# Patient Record
Sex: FEMALE | Race: WHITE | Employment: OTHER | URBAN - METROPOLITAN AREA
[De-identification: names, ages, dates, MRNs, and addresses within clinical notes are randomized per-mention and may not be internally consistent; named-entity substitution may affect disease eponyms.]

---

## 2017-05-09 ENCOUNTER — HOSPITAL ENCOUNTER (OUTPATIENT)
Dept: MAMMOGRAPHY | Age: 42
Discharge: HOME | End: 2017-05-09
Attending: OBSTETRICS & GYNECOLOGY
Payer: COMMERCIAL

## 2017-05-09 DIAGNOSIS — N63.10 LUMP OF RIGHT BREAST: ICD-10-CM

## 2017-05-09 PROCEDURE — 76642 ULTRASOUND BREAST LIMITED: CPT

## 2017-05-09 PROCEDURE — 77066 DX MAMMO INCL CAD BI: CPT

## 2017-05-21 NOTE — PROGRESS NOTES
Notify patient MMG and breast US  are nl, keep an eye on her SBE, let us know if anything changes or she has concerns. Radiologist advises a f/u mammogram (right breast) in 6 months is recommended to document expected stability or resolution of these findings. Thanks.

## 2017-05-29 NOTE — PROGRESS NOTES
Notify patient MMG and additional breast US  are nl, keep an eye on her SBE, let us know if anything changes or she has concerns. F/u  Right breast  MMG is advised   in 6 m per radiologist.   Thanks.

## 2017-09-19 ENCOUNTER — HOSPITAL ENCOUNTER (OUTPATIENT)
Dept: MAMMOGRAPHY | Age: 42
Discharge: HOME | End: 2017-09-19
Attending: OBSTETRICS & GYNECOLOGY
Payer: COMMERCIAL

## 2017-09-19 DIAGNOSIS — N64.4 PAIN OF RIGHT BREAST: ICD-10-CM

## 2017-09-19 DIAGNOSIS — R92.8 FOLLOW-UP EXAMINATION OF ABNORMAL MAMMOGRAM: ICD-10-CM

## 2017-09-19 DIAGNOSIS — N64.4 BREAST PAIN, RIGHT: ICD-10-CM

## 2017-09-19 PROCEDURE — 77065 DX MAMMO INCL CAD UNI: CPT

## 2017-11-01 NOTE — PROGRESS NOTES
Notify pt (my chart, letter or call and document in [facility]):  Right breast MMG is normal.  Advise her to see her plastic surgeon who did her breast implant re the breast pain & document. Continue monthly SBE, notify us if she has any concerns. Thanks.

## 2018-06-28 PROBLEM — J45.990 EXERCISE INDUCED BRONCHOSPASM: Status: ACTIVE | Noted: 2018-06-28

## 2018-07-19 ENCOUNTER — HOSPITAL ENCOUNTER (OUTPATIENT)
Dept: MAMMOGRAPHY | Age: 43
Discharge: HOME | End: 2018-07-19
Attending: OBSTETRICS & GYNECOLOGY
Payer: COMMERCIAL

## 2018-07-19 DIAGNOSIS — Z12.31 VISIT FOR SCREENING MAMMOGRAM: ICD-10-CM

## 2018-07-19 PROCEDURE — 77063 BREAST TOMOSYNTHESIS BI: CPT

## 2018-07-23 NOTE — PROGRESS NOTES
Notify pt (my chart, letter or call and document in [facility]) :  MMG is normal.  Continue monthly SBE, notify us if she has any concerns. There is no FH of breast cancer recorded on her chart. Thanks.

## 2018-10-08 ENCOUNTER — HOSPITAL ENCOUNTER (OUTPATIENT)
Dept: MAMMOGRAPHY | Age: 43
Discharge: HOME | End: 2018-10-08
Attending: OBSTETRICS & GYNECOLOGY
Payer: SELF-PAY

## 2018-10-08 DIAGNOSIS — E28.39 PREMATURE OVARIAN FAILURE: ICD-10-CM

## 2018-10-08 PROCEDURE — 77080 DXA BONE DENSITY AXIAL: CPT

## 2018-10-11 NOTE — PROGRESS NOTES
Results sent to pt by [facility] Real-Time message. Order for a vitamin D level put into [facility].

## 2019-08-09 ENCOUNTER — HOSPITAL ENCOUNTER (OUTPATIENT)
Dept: ULTRASOUND IMAGING | Age: 44
Discharge: HOME | End: 2019-08-09
Attending: OBSTETRICS & GYNECOLOGY
Payer: COMMERCIAL

## 2019-08-09 DIAGNOSIS — E01.0 THYROMEGALY: ICD-10-CM

## 2019-08-09 PROCEDURE — 76536 US EXAM OF HEAD AND NECK: CPT

## 2019-08-21 PROBLEM — R06.02 SHORTNESS OF BREATH: Status: ACTIVE | Noted: 2019-08-21

## 2019-09-11 ENCOUNTER — HOSPITAL ENCOUNTER (OUTPATIENT)
Dept: MAMMOGRAPHY | Age: 44
Discharge: HOME | End: 2019-09-11
Attending: OBSTETRICS & GYNECOLOGY
Payer: COMMERCIAL

## 2019-09-11 DIAGNOSIS — R92.2 BREAST DENSITY: ICD-10-CM

## 2019-09-11 DIAGNOSIS — Z12.39 SCREENING FOR BREAST CANCER: ICD-10-CM

## 2019-09-11 PROCEDURE — 77063 BREAST TOMOSYNTHESIS BI: CPT

## 2020-08-07 ENCOUNTER — HOSPITAL ENCOUNTER (OUTPATIENT)
Dept: MAMMOGRAPHY | Age: 45
Discharge: HOME | End: 2020-08-07
Attending: OBSTETRICS & GYNECOLOGY
Payer: COMMERCIAL

## 2020-08-07 DIAGNOSIS — Z12.31 OTHER SCREENING MAMMOGRAM: ICD-10-CM

## 2020-08-07 PROCEDURE — 77063 BREAST TOMOSYNTHESIS BI: CPT

## 2020-08-18 ENCOUNTER — HOSPITAL ENCOUNTER (OUTPATIENT)
Dept: ULTRASOUND IMAGING | Age: 45
Discharge: HOME | End: 2020-08-18
Attending: OBSTETRICS & GYNECOLOGY
Payer: COMMERCIAL

## 2020-08-18 DIAGNOSIS — R94.6 ABNORMAL THYROID EXAM: ICD-10-CM

## 2020-08-18 PROCEDURE — 76536 US EXAM OF HEAD AND NECK: CPT

## 2021-05-18 ENCOUNTER — TRANSCRIBE ORDER (OUTPATIENT)
Dept: SCHEDULING | Age: 46
End: 2021-05-18

## 2021-05-18 DIAGNOSIS — Z12.31 SCREENING MAMMOGRAM FOR HIGH-RISK PATIENT: Primary | ICD-10-CM

## 2021-08-10 ENCOUNTER — HOSPITAL ENCOUNTER (OUTPATIENT)
Dept: MAMMOGRAPHY | Age: 46
Discharge: HOME | End: 2021-08-10
Attending: OBSTETRICS & GYNECOLOGY
Payer: COMMERCIAL

## 2021-08-10 DIAGNOSIS — Z12.31 SCREENING MAMMOGRAM FOR HIGH-RISK PATIENT: ICD-10-CM

## 2021-08-10 PROCEDURE — 77063 BREAST TOMOSYNTHESIS BI: CPT

## 2022-03-18 PROBLEM — R06.02 SHORTNESS OF BREATH: Status: ACTIVE | Noted: 2019-08-21

## 2022-03-20 PROBLEM — J45.990 EXERCISE INDUCED BRONCHOSPASM: Status: ACTIVE | Noted: 2018-06-28

## 2022-08-08 ENCOUNTER — OFFICE VISIT (OUTPATIENT)
Dept: OBGYN CLINIC | Age: 47
End: 2022-08-08
Payer: COMMERCIAL

## 2022-08-08 VITALS
BODY MASS INDEX: 23.16 KG/M2 | HEIGHT: 65 IN | DIASTOLIC BLOOD PRESSURE: 70 MMHG | WEIGHT: 139 LBS | SYSTOLIC BLOOD PRESSURE: 102 MMHG

## 2022-08-08 DIAGNOSIS — N84.1 CERVICAL POLYP: ICD-10-CM

## 2022-08-08 DIAGNOSIS — Z11.51 SCREENING FOR HUMAN PAPILLOMAVIRUS (HPV): ICD-10-CM

## 2022-08-08 DIAGNOSIS — N95.1 MENOPAUSAL SYMPTOM: ICD-10-CM

## 2022-08-08 DIAGNOSIS — Z12.4 SCREENING FOR CERVICAL CANCER: ICD-10-CM

## 2022-08-08 DIAGNOSIS — Z01.419 WELL WOMAN EXAM WITH ROUTINE GYNECOLOGICAL EXAM: Primary | ICD-10-CM

## 2022-08-08 LAB — FSH SERPL-ACNC: 16.8 MIU/ML

## 2022-08-08 PROCEDURE — 99396 PREV VISIT EST AGE 40-64: CPT | Performed by: OBSTETRICS & GYNECOLOGY

## 2022-08-08 NOTE — PROGRESS NOTES
Annual Exam  Established ptDestini [facility]rientos [facility]   51 y.o.  1975  [id number]    Today:  8/8/2022    Presents for well woman annual exam.  Reports:  monthly spotting w/ her IUD< not enough for a pantiliner, max duration 7d    H/o POF    BC:   IUD. Past Medical History:   Diagnosis Date    Abnormal Pap smear of cervix     cryo    Contraception     Mirena (her 2nd IUD)    Exercise induced bronchospasm 6/28/2018    Family history of malignant neoplasm of colon     dad colon polyp & PGM colon cancer    History of colonoscopy 06/2020    Dr. Beatriz Borja    Multiple thyroid nodules 08/2019    US:  Bilateral small nodules, some mixed cystic/solid, none worrisome. Repeat 1 y/assess for interval change. Premature ovarian failure     FSHs:  2017-->  23,   2018--->  88,    2019 ---> 17,     2020---> 8       Past Surgical History:   Procedure Laterality Date    BREAST SURGERY  2017    Replacement of right implant due to podular encapsulation. COLONOSCOPY  2012    nl @ 26 yo, Dr. Beatriz Borja. Repeat due     [address] OF UTERUS  2003    HERNIA REPAIR  2008    [facility] IMPLANT BREAST SILICONE/EQ Bilateral 6932    revision for ruptured implant (R) 2017       Family History   Problem Relation Age of Onset    Thyroid Disease Mother     Osteoarthritis Mother     Diabetes Father     Colon Polyps Father     Cancer Father         Skin    No Known Problems Brother     Other Maternal Grandmother         ?   Stomach and/or female cancer    Osteoarthritis Maternal Grandmother     Cancer Maternal Grandmother         Stomach    Stroke Maternal Grandfather     Cancer Maternal Grandfather         Prostate    Asthma Maternal Grandfather     Colon Cancer Paternal Grandmother     Cancer Paternal Grandmother         Colon    Breast Cancer Maternal Aunt         68/70    Stroke Maternal Aunt     Deep Vein Thrombosis Neg Hx     Ovarian Cancer Neg Hx     Pulmonary Embolism Neg Hx     Prostate Cancer Neg Hx OB History    Para Term  AB Living   0 0 0 0 0 2   SAB IAB Ectopic Molar Multiple Live Births   0 0 0 0 0 0       Social History     Socioeconomic History    Marital status:      Spouse name: None    Number of children: None    Years of education: None    Highest education level: None   Tobacco Use    Smoking status: Never    Smokeless tobacco: Never    Tobacco comments:     Quit smoking: SOCIALLY   Vaping Use    Vaping Use: Never used   Substance and Sexual Activity    Alcohol use: Yes     Alcohol/week: 4.2 standard drinks    Drug use: Never   Social History Narrative    PCP-Dr Lyons (manages labs)  Derm- Dr Israel Mai- Dr Cerrato Ozark    Daughter, Medina Stanton ( 10/16/2001) also my patient       [unfilled]    Current Outpatient Medications   Medication Sig    Calcium Carbonate-Vitamin D (CALCIUM-VITAMIN D) 600-125 MG-UNIT TABS Take by mouth    Cholecalciferol 50 MCG (2000) TABS Take by mouth    estradiol (VIVELLE) 0.1 MG/24HR APPLY ONE PATCH TO THE SKIN TWO TIMES PER WEEK    estradiol (ESTRACE) 0.1 MG/GM vaginal cream Insert 1 gram vaginally and a pea size externally nightly for 2 weeks then twice weekly    ferrous sulfate (FE TABS 325) 325 (65 Fe) MG EC tablet Take 325 mg by mouth 3 times daily (with meals)    levonorgestrel (MIRENA) IUD 52 mg 1 each by IntraUTERine route once     No current facility-administered medications for this visit. Review of Systems    Updated from patient's \"Review of Systems\" form that patient completed. Physical Exam:  /70   Ht 5' 5\" (1.651 m)   Wt 139 lb (63 kg)   BMI 23.13 kg/m² , No LMP recorded. (Menstrual status: IUD). Patient is a 51 y.o. female who appears pleasant, in no apparent distress, her given age, well developed, well nourished and with good attention to hygiene and body habitus. Oriented to person, place and time. Mood and affect normal and appropriate to situation.    Head is normocephalic, atraumatic, without any gross head or neck masses. Neck: Neck exam reveals no abnormalities. Thyroid difficult to isolate, no abnormalities appreciated. Lymph nodes:   Neck lymph nodes are normal.   No supraclavicular lymphadenopathy noted. No axillary lymphadenopathy noted. No groin lymphadenopathy noted. Cardiovascular: Heart auscultation reveals no murmurs, gallop, rubs or clicks. Skin: No skin rash, abnormal appearing nevi, subcutaneous nodules, lesions or ulcers observed. Abdomen: Abdomen soft, non tender, without palpable masses. Palpation of liver reveals no abnormalities with respect to size, tenderness or masses. Palpation of spleen reveals no abnormalities with respect to size, tenderness or masses. Breast: Symmetrical, no: dimpling, retractions, adenopathy, dominant masses or nipple discharge elicited. Breasts show no palpable masses or tenderness. Bilateral implants intact  No changes suspicious for malignancy      Genitourinary:  External genitalia are normal in appearance. Examination of urethral meatus reveals location normal and size normal.   Examination of urethra shows no abnormalities. Examination of vaginal vault reveals no abnormalities. Cervix: shows no pathology. IUD strings seen  Uterus: Normal size, shape and contour. Adnexa and parametria show no masses, tenderness, organomegaly or nodularity. Examination of anus and perineum shows no abnormalities. Rectal exam reveals normal sphincter tone without presence of hemorrhoids or masses. ASSESSMENT and PLAN    1.  AE. Importance of self breast exam reviewed, pt educated how to perform SBE.      2.  Her PCP Dr. Brandyn Kim. 3.   COVID & flu vaccines current    4. No h/o abnormal paps. Patient requests pap today. 5.  Yearly dermatologic skin check advised. Patient will schedule at her convenience. No worrisome nevi noted on exam.  A list of local dermatologists was given to [place].       6.   MMG is: Scheduled for next week     7. Multiple concerns regarding her daughter, Chele Stuart    8.  ? Polyp, no PCB--> US    Re ROS--> non gynecologic concerns referred back to her PCP or appropriate specialist.      Wt Readings from Last 3 Encounters: Wt Readings from Last 3 Encounters:   08/08/22 139 lb (63 kg)   02/24/22 139 lb (63 kg)   08/24/21 142 lb (64.4 kg)       BP Readings from Last 3 Encounters:  BP Readings from Last 3 Encounters:   08/08/22 102/70   02/24/22 112/72   08/24/21 104/64        Diagnosis Orders   1. Well woman exam with routine gynecological exam        2. Screening for cervical cancer  PAP IG, Aptima HPV and rfx 16/18,45 ([id number])    PAP IG, Aptima HPV and rfx 16/18,45 ([id number])      3. Screening for human papillomavirus (HPV)  PAP IG, Aptima HPV and rfx 16/18,45 ([id number])    PAP IG, Aptima HPV and rfx 16/18,45 ([id number])      4. Menopausal symptom  Follicle Stimulating Hormone      5. Cervical polyp            Orders Placed This Encounter   Procedures    PAP IG, Aptima HPV and rfx 12/53,97 ([id number])    Follicle Stimulating Hormone     More than 50% of this 35+  minute visit was spent addressing the above patient concerns including:  assessment, extensive chart review and counseling the patient about the above concerns including evaluation plan, treatment strategies & documentation. Long conversation with patient, all her questions answered and addressed all her concerns. Dictated using voice recognition software.   Proofread but unrecognized errors may exist.    Signed by:  Mone Bailey MD, [facility]

## 2022-08-10 LAB
CYTOLOGIST CVX/VAG CYTO: NORMAL
CYTOLOGY CVX/VAG DOC THIN PREP: NORMAL
HPV APTIMA: NEGATIVE
Lab: NORMAL
PATH REPORT.FINAL DX SPEC: NORMAL
STAT OF ADQ CVX/VAG CYTO-IMP: NORMAL

## 2022-08-15 ENCOUNTER — HOSPITAL ENCOUNTER (OUTPATIENT)
Dept: MAMMOGRAPHY | Age: 47
Discharge: HOME | End: 2022-08-18
Payer: COMMERCIAL

## 2022-08-15 DIAGNOSIS — Z12.31 OTHER SCREENING MAMMOGRAM: ICD-10-CM

## 2022-08-15 PROCEDURE — 77063 BREAST TOMOSYNTHESIS BI: CPT

## 2022-08-15 PROCEDURE — 77067 SCR MAMMO BI INCL CAD: CPT

## 2022-09-19 ENCOUNTER — OFFICE VISIT (OUTPATIENT)
Dept: OBGYN CLINIC | Age: 47
End: 2022-09-19
Payer: COMMERCIAL

## 2022-09-19 VITALS
WEIGHT: 140 LBS | DIASTOLIC BLOOD PRESSURE: 65 MMHG | HEIGHT: 65 IN | SYSTOLIC BLOOD PRESSURE: 112 MMHG | BODY MASS INDEX: 23.32 KG/M2

## 2022-09-19 DIAGNOSIS — N84.1 CERVICAL POLYP: ICD-10-CM

## 2022-09-19 DIAGNOSIS — N95.1 MENOPAUSAL SYMPTOM: ICD-10-CM

## 2022-09-19 DIAGNOSIS — N95.1 PERIMENOPAUSAL SYMPTOM: ICD-10-CM

## 2022-09-19 DIAGNOSIS — N93.9 VAGINAL BLEEDING: Primary | ICD-10-CM

## 2022-09-19 PROCEDURE — 76830 TRANSVAGINAL US NON-OB: CPT | Performed by: OBSTETRICS & GYNECOLOGY

## 2022-09-19 PROCEDURE — 99213 OFFICE O/P EST LOW 20 MIN: CPT | Performed by: OBSTETRICS & GYNECOLOGY

## 2022-09-19 NOTE — PROGRESS NOTES
Follow up visit    Pietro Barrientos [facility]   51 y.o.  1975  [id number]    Today:  2022    No chief complaint on file. HPI:         2022 US today:  ANTEVERTED, HETEROGENEOUS UT 7.5/5/3.7, vol 71 WITH  IUD VISUALIZED IN THE FUNDAL ENDOMETRIUM,  MEASURING 0.8CM FROM THE TIP OF THE ENDO.  ENDOMETRIUM - 2.52MM  CERVIX - TINY CALCIFICATION VISUALIZED IN THE ANTERIOR  WALL OF THE CX.  RT OV - NOT VISUALIZED DUE TO BOWEL. RT ADNEXA - PROMINENT LOOPS OF BOWEL VISUALIZED. LT OV - APPEARS WNL  LT ADENXA - APPEARS WNL  NO FF    BC:   IUD    2022  AE  Reports:  monthly spotting w/ her IUD, not enough for a pantiliner, max duration 7d     H/o POF     BC:   IUD. A/P:  2. Her PCP Dr. Xander Hernandez. 3.   COVID & flu vaccines current  4. No h/o abnormal paps. Patient requests pap today. 5.  Yearly dermatologic skin check advised. Patient will schedule at her convenience. No worrisome nevi noted on exam.  A list of local dermatologists was given to [place]. 6.   MMG is: Scheduled for next week   7. Multiple concerns regarding her daughter, Clay Mccoy  8.  ? Polyp, no PCB--> US    OB History               Para        Term                AB        Living   2         SAB        IAB        Ectopic        Molar        Multiple        Live Births                  No LMP recorded. (Menstrual status: IUD). Past Surgical History:   Procedure Laterality Date    BREAST SURGERY      Replacement of right implant due to podular encapsulation. COLONOSCOPY      nl @ 28 yo, Dr. Beatriz Borja. Repeat due     [address] OF UTERUS      HERNIA REPAIR      [facility]     IMPLANT BREAST SILICONE/EQ Bilateral 8761    revision for ruptured implant (R)      Past Medical History:   Diagnosis Date    Abnormal Pap smear of cervix     cryo    Contraception     Mirena (her 2nd IUD)    Exercise induced bronchospasm 2018    Family history of malignant neoplasm of Estradiol  and/or possible EMB. Re ROS--> non gynecologic concerns referred back to her PCP or appropriate specialist.     Orders Placed This Encounter   Procedures    AMB POC US, TRANSVAGINAL        Diagnosis Orders   1. Cervical polyp  AMB POC US, TRANSVAGINAL      2.  Menopausal symptom  AMB POC US, Dawood Hernandez MD, [address] [place] [address]

## 2022-09-28 RX ORDER — ESTRADIOL 0.1 MG/G
1 CREAM VAGINAL DAILY
Qty: 42.5 G | Refills: 2 | Status: SHIPPED | OUTPATIENT
Start: 2022-09-28

## 2022-10-19 RX ORDER — ESTRADIOL 0.1 MG/G
CREAM VAGINAL
Qty: 42.5 G | OUTPATIENT
Start: 2022-10-19

## 2023-01-16 ENCOUNTER — OFFICE VISIT (OUTPATIENT)
Dept: OBGYN CLINIC | Age: 48
End: 2023-01-16
Payer: COMMERCIAL

## 2023-01-16 VITALS — BODY MASS INDEX: 23.46 KG/M2 | WEIGHT: 141 LBS | DIASTOLIC BLOOD PRESSURE: 70 MMHG | SYSTOLIC BLOOD PRESSURE: 108 MMHG

## 2023-01-16 DIAGNOSIS — Z97.5 BREAKTHROUGH BLEEDING ASSOCIATED WITH INTRAUTERINE DEVICE (IUD): Primary | ICD-10-CM

## 2023-01-16 DIAGNOSIS — E28.39 PREMATURE OVARIAN FAILURE: ICD-10-CM

## 2023-01-16 DIAGNOSIS — Z97.5 IUD (INTRAUTERINE DEVICE) IN PLACE: ICD-10-CM

## 2023-01-16 DIAGNOSIS — N92.1 BREAKTHROUGH BLEEDING ASSOCIATED WITH INTRAUTERINE DEVICE (IUD): Primary | ICD-10-CM

## 2023-01-16 PROCEDURE — 58100 BIOPSY OF UTERUS LINING: CPT | Performed by: OBSTETRICS & GYNECOLOGY

## 2023-01-16 PROCEDURE — 99213 OFFICE O/P EST LOW 20 MIN: CPT | Performed by: OBSTETRICS & GYNECOLOGY

## 2023-01-16 RX ORDER — MINOCYCLINE HYDROCHLORIDE 100 MG/1
100 CAPSULE ORAL 2 TIMES DAILY
COMMUNITY

## 2023-01-16 NOTE — PROGRESS NOTES
Thuy Like [facility]  49 y.o.  1975  [id number]    Today:  1/16/2023    Patient presents today with BTB w/ mirena/ h/o POG  Has her 3rd IUD, Mirena placed 8/2018, expires 8/2026 2/2022 Vivelle 0.1 mg patch rxd- per pt changes twice/wk  9/2022 Estrace cream 0.1 mg/ml rxd- per pt uses inconsistently    H/o POF    Ref Range & Units 8/3/20 1614 1/3/19 0848 7/19/18 0859 5/10/17 1700 11/6/15 0909 8/3/15 1634 7/2015   FSH 8.6 8.6  17.4 CM  87.9 CM  23.4 CM  17.6 CM  79.3 CM                      Lab Results   Component Value Date/Time    FSH 16.8 08/08/2022 12:19 PM       Prior to her IUDs she had regular periods  Reports amenorrhea for 13 y (started after her youngest was born). VB resumed 9 m ago    VB occurred:  September 21-24, (very light)  October             3    (1 day only), 22-25, 28 & 29  November         10-19 December          2,   7 & 8,   20 & 21 January 2023 9-13    Majority of the above bleeding is very light, \"staining pinkish-brown-->brown\",  not enough for a panty liner    8/2022:   Pap normal negative HPV    9/19/2022 US today:  ANTEVERTED, HETEROGENEOUS UT 7.5/5/3.7, vol 71 WITH  IUD VISUALIZED IN THE FUNDAL ENDOMETRIUM,  MEASURING 0.8CM FROM THE TIP OF THE ENDO.  ENDOMETRIUM - 2.52MM  CERVIX - TINY CALCIFICATION VISUALIZED IN THE ANTERIOR  WALL OF THE CX.  RT OV - NOT VISUALIZED DUE TO BOWEL. RT ADNEXA - PROMINENT LOOPS OF BOWEL VISUALIZED.   LT OV - APPEARS WNL  LT ADENXA - APPEARS WNL  NO FF     Allergies:   No Known Allergies    Medication History:  Current Outpatient Medications   Medication Sig Dispense Refill    minocycline (MINOCIN;DYNACIN) 100 MG capsule Take 100 mg by mouth 2 times daily      estradiol (ESTRACE) 0.1 MG/GM vaginal cream Place 1 g vaginally daily Insert 1 gram vaginally and a pea size externally nightly for 2 weeks then twice weekly 42.5 g 2    Calcium Carbonate-Vitamin D (CALCIUM-VITAMIN D) 600-125 MG-UNIT TABS Take by mouth      Cholecalciferol 50 MCG (2000 UT) TABS Take by mouth      estradiol (VIVELLE) 0.1 MG/24HR APPLY ONE PATCH TO THE SKIN TWO TIMES PER WEEK      ferrous sulfate (FE TABS 325) 325 (65 Fe) MG EC tablet Take 325 mg by mouth 3 times daily (with meals)      levonorgestrel (MIRENA) IUD 52 mg 1 each by IntraUTERine route once       No current facility-administered medications for this visit. Past Medical History:  Past Medical History:   Diagnosis Date    Abnormal Pap smear of cervix     cryo    Contraception     Mirena (her 2nd IUD)    Exercise induced bronchospasm 6/28/2018    Family history of malignant neoplasm of colon     dad colon polyp & PGM colon cancer    History of colonoscopy 06/2020    Dr. Mina Fuentes    Multiple thyroid nodules 08/2019    US:  Bilateral small nodules, some mixed cystic/solid, none worrisome. Repeat 1 y/assess for interval change. Premature ovarian failure     FSHs:  2017-->  23,   2018--->  88,    2019 ---> 17,     2020---> 8       Past Surgical History:  Past Surgical History:   Procedure Laterality Date    BREAST SURGERY  2017    Replacement of right implant due to podular encapsulation. COLONOSCOPY  2012    nl @ 26 yo, Dr. Mina Fuentes. Repeat due     [address] OF UTERUS  2003    HERNIA REPAIR  2008    [facility] IMPLANT BREAST SILICONE/EQ Bilateral 3537    revision for ruptured implant (R) 2017       Social History:  Social History     Socioeconomic History    Marital status:      Spouse name: Not on file    Number of children: Not on file    Years of education: Not on file    Highest education level: Not on file   Occupational History    Not on file   Tobacco Use    Smoking status: Never    Smokeless tobacco: Never    Tobacco comments:     Quit smoking: SOCIALLY   Vaping Use    Vaping Use: Never used   Substance and Sexual Activity    Alcohol use:  Yes     Alcohol/week: 4.2 standard drinks    Drug use: Never    Sexual activity: Not on file   Other Topics Concern    Not on file Social History Narrative    PCP-Dr Lyons (manages labs)  Derm- Dr Jamila Mendez- Dr Avilez Faster    Daughter, Dorina Roberts ( 10/16/2001) also my patient     Social Determinants of Health     Financial Resource Strain: Not on file   Food Insecurity: Not on file   Transportation Needs: Not on file   Physical Activity: Not on file   Stress: Not on file   Social Connections: Not on file   Intimate Partner Violence: Not on file   Housing Stability: Not on file       Family History:  Family History   Problem Relation Age of Onset    Thyroid Disease Mother     Osteoarthritis Mother     Diabetes Father     Colon Polyps Father     Cancer Father         Skin    No Known Problems Brother     Other Maternal Grandmother         ? Stomach and/or female cancer    Osteoarthritis Maternal Grandmother     Cancer Maternal Grandmother         Stomach    Stroke Maternal Grandfather     Cancer Maternal Grandfather         Prostate    Asthma Maternal Grandfather     Colon Cancer Paternal Grandmother     Cancer Paternal Grandmother         Colon    Breast Cancer Maternal Aunt         68/70    Stroke Maternal Aunt     Deep Vein Thrombosis Neg Hx     Ovarian Cancer Neg Hx     Pulmonary Embolism Neg Hx     Prostate Cancer Neg Hx        Physical Exam:  /70   Wt 141 lb (64 kg)   BMI 23.46 kg/m²     Maryellen Ngo is a 49 y.o. female who appears pleasant, well developed, well nourished, with good attention to hygiene and body habitus. Oriented to person, place and time. Edison Jose is in no apparent distress. Psych:  Mood and affect are normal and appropriate to the situation. Head is normocephalic, atraumatic, without any gross head or neck masses. Eyes: Sclera are clear. Conjunctiva and lids within normal limits. No icterus.    Ears and Nose: no gross deformities to visual inspection, gross hearing intact  External genitalia: normal appearing, minimal erythema, no lesions  Vagina: pink with normal rugations, no lesions  Cervix: multiparous, normal appearing, no lesions. IUD string ~4 cm  Perineum and anus:  no abnormalities    The patient consent was signed and dated. Time out was performed to confirm procedure. A bivalve speculum was placed in the vagina. The cervix was washed with Betadine. The anterior lip was grasped with a single tooth tenaculum. The uterus was sounded to 7cm. An endometrial pipette was inserted into the endometrial cavity. Suction was applied to the pipette to obtain a good specimen. 2 passes were made to ensure an adequate specimen. Specimen was sent to pathology for evaluation. All instruments were removed from the vagina. Patient tolerated procedure well. A/P:  3  49year-old, A1, ?  POF, FSH c/w POF in 2015 & 2017, AUB/perimenopausal hormonal shifts, --> Await bx result, formulate treatment plan accordingly. ? [address] [facility]     8/3/20 1614 1/3/19 0848 18 0859 5/10/17 1700 11/6/15 0909 8/3/15 1634  2015   FSH mIU/mL 8.6  17.4 CM  87.9 CM  23.4 CM  17.6 CM  79.3      Consider d/c'g vivelle patch. Other options:   naproxen 500 mg bid x 5d   tranexamic acid tid until bleeding stops (typically 4 days),   mifepristone 100 mg (a single monthly dose)       Diagnosis Orders   1. Breakthrough bleeding associated with intrauterine device (IUD)  [facility]    BIOPSY OF UTERUS LINING    [facility]    [facility]      2. IUD (intrauterine device) in place        3.  Premature ovarian failure            Orders Placed This Encounter   Procedures    BIOPSY OF UTERUS LINING    [facility]     Endometrium BX     Standing Status:   Future     Number of Occurrences:   1     Standing Expiration Date:   2024    [facility]     ATLASORDERNUMBER:[id number]:[id number]*[facility] Stan Handley MD, [address] [facility]

## 2023-01-27 RX ORDER — NAPROXEN 500 MG/1
500 TABLET ORAL 2 TIMES DAILY WITH MEALS
Qty: 20 TABLET | Refills: 0 | Status: SHIPPED | OUTPATIENT
Start: 2023-01-27

## 2023-01-28 ENCOUNTER — PATIENT MESSAGE (OUTPATIENT)
Dept: OBGYN CLINIC | Age: 48
End: 2023-01-28

## 2023-01-31 RX ORDER — NAPROXEN 500 MG/1
500 TABLET ORAL 2 TIMES DAILY WITH MEALS
Qty: 20 TABLET | Refills: 0 | Status: SHIPPED | OUTPATIENT
Start: 2023-01-31

## 2023-02-22 ENCOUNTER — TELEPHONE (OUTPATIENT)
Dept: OBGYN CLINIC | Age: 48
End: 2023-02-22

## 2023-02-22 NOTE — TELEPHONE ENCOUNTER
----- Message from [place] ALINE Nieto sent at 2/18/2023  2:26 PM EST -----  Regarding: Test Results/Prescription? I stopped the Vivelle patch and took the Naproxen as prescribed. I have not had any vaginal bleeding since, but I'm back to having hot flashes. Should I resume the Vivelle patch? SWATHI Advise: \"Tell [place] she can resume the Vivelle patch. Ask her to log any/all VB for the next 3-6 m (the \"laura period tracker  & calendar kat\" make it easy). Schedule a f/u ov in ~ 4 wk to see how she is doing, if everything is going fine and she has not had any problems she can cancel that office visit but this way she will have an appointment available if she needs it\"    [facility] is fully booked, so the pt agreed to monitor her sx and follow up at her [address] [facility] [facility] appt in May.

## 2023-03-29 ENCOUNTER — TELEPHONE (OUTPATIENT)
Dept: OBGYN CLINIC | Age: 48
End: 2023-03-29

## 2023-03-29 RX ORDER — PHENAZOPYRIDINE HYDROCHLORIDE 200 MG/1
200 TABLET, FILM COATED ORAL 3 TIMES DAILY PRN
Qty: 9 TABLET | Refills: 0 | Status: SHIPPED | OUTPATIENT
Start: 2023-03-29 | End: 2023-04-01

## 2023-03-29 RX ORDER — SULFAMETHOXAZOLE AND TRIMETHOPRIM 800; 160 MG/1; MG/1
1 TABLET ORAL 2 TIMES DAILY
Qty: 14 TABLET | Refills: 0 | Status: SHIPPED | OUTPATIENT
Start: 2023-03-29 | End: 2023-04-05

## 2023-03-29 NOTE — TELEPHONE ENCOUNTER
----- Message from Irena Nieto sent at 3/29/2023 10:08 AM EDT -----  Regarding: UTI? I think I have a UTI. Burning with urination started yesterday, plus cloudy urine. I awoke at 2:30am to go to the bathroom, and pain had increased, with even cloudier urine. Had trouble going back to sleep because I kept feeling like I needed to urinate again. Very cloudy urine today with continued burning.   Can you call in something for me, or should I come in?    ----3/29/23 1:48 pm  CHARITY WHARTON MA---  Per SWATHI, Please send in Bactrim DS BID x 7 days and Pyridium 200 PO TID x 3 days to the [facility] in [facility]

## 2023-05-16 ENCOUNTER — OFFICE VISIT (OUTPATIENT)
Dept: OBGYN CLINIC | Age: 48
End: 2023-05-16
Payer: COMMERCIAL

## 2023-05-16 VITALS
BODY MASS INDEX: 22.96 KG/M2 | SYSTOLIC BLOOD PRESSURE: 110 MMHG | DIASTOLIC BLOOD PRESSURE: 70 MMHG | WEIGHT: 137.8 LBS | HEIGHT: 65 IN

## 2023-05-16 DIAGNOSIS — F41.9 ANXIETY: ICD-10-CM

## 2023-05-16 DIAGNOSIS — Z79.890 CURRENT LONG-TERM USE OF POSTMENOPAUSAL HORMONE REPLACEMENT THERAPY: ICD-10-CM

## 2023-05-16 DIAGNOSIS — N92.1 BREAKTHROUGH BLEEDING ASSOCIATED WITH INTRAUTERINE DEVICE (IUD): Primary | ICD-10-CM

## 2023-05-16 DIAGNOSIS — Z97.5 BREAKTHROUGH BLEEDING ASSOCIATED WITH INTRAUTERINE DEVICE (IUD): Primary | ICD-10-CM

## 2023-05-16 PROCEDURE — 76830 TRANSVAGINAL US NON-OB: CPT | Performed by: OBSTETRICS & GYNECOLOGY

## 2023-05-16 PROCEDURE — 99214 OFFICE O/P EST MOD 30 MIN: CPT | Performed by: OBSTETRICS & GYNECOLOGY

## 2023-05-16 RX ORDER — TRANEXAMIC ACID 650 MG/1
1300 TABLET ORAL 3 TIMES DAILY
Qty: 30 TABLET | Refills: 2 | Status: SHIPPED | OUTPATIENT
Start: 2023-05-16

## 2023-05-16 RX ORDER — DIAZEPAM 2 MG/1
2-4 TABLET ORAL EVERY 6 HOURS PRN
Qty: 10 TABLET | Refills: 0 | Status: SHIPPED | OUTPATIENT
Start: 2023-05-16 | End: 2023-05-26

## 2023-05-16 SDOH — ECONOMIC STABILITY: FOOD INSECURITY: WITHIN THE PAST 12 MONTHS, YOU WORRIED THAT YOUR FOOD WOULD RUN OUT BEFORE YOU GOT MONEY TO BUY MORE.: NEVER TRUE

## 2023-05-16 SDOH — ECONOMIC STABILITY: FOOD INSECURITY: WITHIN THE PAST 12 MONTHS, THE FOOD YOU BOUGHT JUST DIDN'T LAST AND YOU DIDN'T HAVE MONEY TO GET MORE.: NEVER TRUE

## 2023-05-16 SDOH — ECONOMIC STABILITY: INCOME INSECURITY: HOW HARD IS IT FOR YOU TO PAY FOR THE VERY BASICS LIKE FOOD, HOUSING, MEDICAL CARE, AND HEATING?: NOT HARD AT ALL

## 2023-05-16 SDOH — ECONOMIC STABILITY: HOUSING INSECURITY
IN THE LAST 12 MONTHS, WAS THERE A TIME WHEN YOU DID NOT HAVE A STEADY PLACE TO SLEEP OR SLEPT IN A SHELTER (INCLUDING NOW)?: NO

## 2023-05-17 LAB — FSH SERPL-ACNC: 27.1 MIU/ML

## 2023-05-30 DIAGNOSIS — A05.9 FOOD POISONING: Primary | ICD-10-CM

## 2023-05-30 RX ORDER — ESTRADIOL 0.1 MG/D
FILM, EXTENDED RELEASE TRANSDERMAL
Qty: 8 PATCH | OUTPATIENT
Start: 2023-05-30

## 2023-05-30 RX ORDER — AZITHROMYCIN 250 MG/1
500 TABLET, FILM COATED ORAL DAILY
Qty: 10 TABLET | Refills: 0 | Status: SHIPPED | OUTPATIENT
Start: 2023-05-30

## 2023-05-30 NOTE — TELEPHONE ENCOUNTER
Re note below:        May 29, 2023  Myke Barboza [facility]  to [address] [facility] Staff (supporting Svetlana Kim MD)    5:21 PM  I leave next Saturday to chaperone my son's senior trip to the [facility]. It has been suggested to us to obtain a prescription for a Z-Pack to have on hand, just in case we have any food-born illnesses. Is that something you are willing to do? Thanks!      Lonzell Collet      Orders Placed This Encounter   Medications    azithromycin (ZITHROMAX) 250 MG tablet     Sig: Take 2 tablets by mouth daily 500mg oral daily     Dispense:  10 tablet     Refill:  0

## 2023-06-09 RX ORDER — ESTRADIOL 0.1 MG/D
1 FILM, EXTENDED RELEASE TRANSDERMAL
Qty: 8 PATCH | Refills: 2 | Status: SHIPPED | OUTPATIENT
Start: 2023-06-12

## 2023-07-26 ENCOUNTER — TRANSCRIBE ORDERS (OUTPATIENT)
Dept: SCHEDULING | Age: 48
End: 2023-07-26

## 2023-07-26 DIAGNOSIS — Z12.31 SCREENING MAMMOGRAM FOR HIGH-RISK PATIENT: Primary | ICD-10-CM

## 2023-08-29 ENCOUNTER — HOSPITAL ENCOUNTER (OUTPATIENT)
Dept: MAMMOGRAPHY | Age: 48
Discharge: HOME | End: 2023-09-01
Attending: OBSTETRICS & GYNECOLOGY
Payer: COMMERCIAL

## 2023-08-29 DIAGNOSIS — Z12.31 SCREENING MAMMOGRAM FOR HIGH-RISK PATIENT: ICD-10-CM

## 2023-08-29 PROCEDURE — 77063 BREAST TOMOSYNTHESIS BI: CPT

## 2023-09-10 ENCOUNTER — PATIENT MESSAGE (OUTPATIENT)
Dept: OBGYN CLINIC | Age: 48
End: 2023-09-10

## 2023-09-12 NOTE — TELEPHONE ENCOUNTER
Please call Lady Esqueda, regarding her questions, stop the estrogen patch. Start Tranexamic Acid--> take 2 pills 3 times/day for up to 5 days.    (650 mg 3 times daily for 5 days max). During that time period the bleeding will typically stop. Do not take that medicine for longer than 5 days. Track any/all bleeding episodes so she can tell me when they occurred at her next office visit. If bleeding recurs after a week (or 2 or 3) then resume the tranexamic acid for up to 5 days max. Schedule a follow-up office visit with me in ~ 3 months for an annual exam (last Pap smear 8/2022) we can discuss how her bleeding has been at that point.     If breakthrough bleeding persists-->consider hysterectomy (minimally invasive, robotic, same-day surgery with 4-6-week recovery)

## 2023-10-16 ENCOUNTER — TELEPHONE (OUTPATIENT)
Dept: OBGYN CLINIC | Age: 48
End: 2023-10-16

## 2023-10-16 DIAGNOSIS — N92.1 BREAKTHROUGH BLEEDING ASSOCIATED WITH INTRAUTERINE DEVICE (IUD): ICD-10-CM

## 2023-10-16 DIAGNOSIS — Z97.5 BREAKTHROUGH BLEEDING ASSOCIATED WITH INTRAUTERINE DEVICE (IUD): ICD-10-CM

## 2023-10-16 DIAGNOSIS — N93.9 ABNORMAL UTERINE BLEEDING (AUB): Primary | ICD-10-CM

## 2023-10-16 RX ORDER — ESTRADIOL 0.1 MG/D
FILM, EXTENDED RELEASE TRANSDERMAL
Qty: 24 PATCH | Refills: 0 | Status: SHIPPED | OUTPATIENT
Start: 2023-10-16

## 2023-10-16 NOTE — TELEPHONE ENCOUNTER
1. Needs a follow-up office visit with me in the next 2-4 weeks    2. Needs [address] level- have her come in for a lab only ov prior to her f/u ov w/ me    3. Rx for vivelle 0.1 mg patch sent to her [place]    4. May need her mirena IUD replaced, need to see what her [address] level is ( I need this result BEFORE her f/u ov to tx appropriately). 5.  If she needs long term HRT the mirena IUD is a good progesterone option.

## 2023-10-16 NOTE — TELEPHONE ENCOUNTER
Pt called stating that she d/c her estradiol patch and started taking Lysteda for VB. Bleeding has gotten much better, however, her hot flashes and night sweats have returned. Pt would like to know if she can start using the patch again to help with these symptoms. Please advise, thank you.

## 2023-10-30 ENCOUNTER — NURSE ONLY (OUTPATIENT)
Dept: OBGYN CLINIC | Age: 48
End: 2023-10-30

## 2023-10-30 DIAGNOSIS — E28.39 PREMATURE OVARIAN FAILURE: Primary | ICD-10-CM

## 2023-10-30 LAB — FSH SERPL-ACNC: 50.2 MIU/ML

## 2023-11-16 PROBLEM — N39.0 UTI (URINARY TRACT INFECTION): Status: ACTIVE | Noted: 2023-08-13

## 2023-11-16 PROBLEM — R52 BURNING PAIN: Status: ACTIVE | Noted: 2023-08-13

## 2023-11-27 RX ORDER — ESTRADIOL 0.1 MG/D
1 FILM, EXTENDED RELEASE TRANSDERMAL
Qty: 8 PATCH | Refills: 11 | Status: SHIPPED | OUTPATIENT
Start: 2023-11-27

## 2023-11-27 NOTE — TELEPHONE ENCOUNTER
TC from pt needing refills of estradiol patch to get to upcoming appt. Refills sent to pharmacy on file.

## 2023-12-12 ENCOUNTER — OFFICE VISIT (OUTPATIENT)
Dept: OBGYN CLINIC | Age: 48
End: 2023-12-12

## 2023-12-12 VITALS
DIASTOLIC BLOOD PRESSURE: 64 MMHG | WEIGHT: 136 LBS | BODY MASS INDEX: 22.66 KG/M2 | SYSTOLIC BLOOD PRESSURE: 110 MMHG | HEIGHT: 65 IN

## 2023-12-12 DIAGNOSIS — Z79.890 CURRENT LONG-TERM USE OF POSTMENOPAUSAL HORMONE REPLACEMENT THERAPY: ICD-10-CM

## 2023-12-12 DIAGNOSIS — Z11.51 SCREENING FOR HPV (HUMAN PAPILLOMAVIRUS): ICD-10-CM

## 2023-12-12 DIAGNOSIS — Z01.419 WELL WOMAN EXAM WITH ROUTINE GYNECOLOGICAL EXAM: Primary | ICD-10-CM

## 2023-12-12 DIAGNOSIS — F41.9 ANXIETY: ICD-10-CM

## 2023-12-12 DIAGNOSIS — Z12.31 ENCOUNTER FOR SCREENING MAMMOGRAM FOR MALIGNANT NEOPLASM OF BREAST: ICD-10-CM

## 2023-12-12 DIAGNOSIS — Z12.4 SCREENING FOR CERVICAL CANCER: ICD-10-CM

## 2023-12-12 RX ORDER — DIAZEPAM 2 MG/1
TABLET ORAL
Qty: 10 TABLET | Refills: 0 | Status: SHIPPED | OUTPATIENT
Start: 2023-12-12 | End: 2024-12-12

## 2023-12-12 RX ORDER — ESTRADIOL 0.1 MG/D
1 FILM, EXTENDED RELEASE TRANSDERMAL
Qty: 24 PATCH | Refills: 4 | Status: SHIPPED | OUTPATIENT
Start: 2023-12-14

## 2023-12-12 ASSESSMENT — PATIENT HEALTH QUESTIONNAIRE - PHQ9
SUM OF ALL RESPONSES TO PHQ QUESTIONS 1-9: 0
2. FEELING DOWN, DEPRESSED OR HOPELESS: 0
SUM OF ALL RESPONSES TO PHQ QUESTIONS 1-9: 0
SUM OF ALL RESPONSES TO PHQ QUESTIONS 1-9: 0
1. LITTLE INTEREST OR PLEASURE IN DOING THINGS: 0
SUM OF ALL RESPONSES TO PHQ QUESTIONS 1-9: 0
SUM OF ALL RESPONSES TO PHQ9 QUESTIONS 1 & 2: 0

## 2023-12-12 NOTE — PROGRESS NOTES
Annual Exam  Established pt[facility]ini [facility]      51 y.o.  1975  [id number]    Today:  12/12/23        Presents for well woman annual exam.  Reports: Amenorrhea with her IUD, likes her estrogen patch, no hot flashes/night sweats. H/o POF    BC:   IUD. Past Medical History:   Diagnosis Date    Abnormal Pap smear of cervix     cryo    Contraception     Mirena (her 2nd IUD)    Ectopic pregnancy 2000    Exercise induced bronchospasm 6/28/2018    Family history of malignant neoplasm of colon     dad colon polyp & PGM colon cancer    History of colonoscopy 06/2020    Dr. Lynsey cMcracken    Menopausal symptoms 2015    Hot Flashes    Multiple thyroid nodules 08/2019    US:  Bilateral small nodules, some mixed cystic/solid, none worrisome. Repeat 1 y/assess for interval change. Premature ovarian failure     FSHs:  2017-->  23,   2018--->  88,    2019 ---> 17,     2020---> 8       Past Surgical History:   Procedure Laterality Date    BREAST SURGERY  2017    Replacement of right implant due to podular encapsulation. COLONOSCOPY  2012    nl @ 26 yo, Dr. Lynsey Mccracken. Repeat due     [address]    After ectopic? DILATION AND CURETTAGE OF UTERUS  2003    HERNIA REPAIR  2008    Umb. IMPLANT BREAST SILICONE/EQ Bilateral 2960    revision for ruptured implant (R) 9231    UMBILICAL HERNIA REPAIR  2005       Family History   Problem Relation Age of Onset    Colon Cancer Paternal Grandmother     Other Maternal Grandmother         ?   Stomach and/or female cancer    Osteoarthritis Maternal Grandmother     Stomach Cancer Maternal Grandmother     Cancer Maternal Grandmother     Stroke Maternal Grandfather     Prostate Cancer Maternal Grandfather     Asthma Maternal Grandfather     Diabetes Father     Colon Polyps Father     Skin Cancer Father     Thyroid Disease Mother     Osteoarthritis Mother     No Known Problems Brother     Breast Cancer Maternal Aunt         96/42

## 2023-12-16 PROBLEM — N39.0 UTI (URINARY TRACT INFECTION): Status: RESOLVED | Noted: 2023-08-13 | Resolved: 2023-12-16

## 2024-01-29 ENCOUNTER — OFFICE VISIT (OUTPATIENT)
Dept: OBGYN CLINIC | Age: 49
End: 2024-01-29
Payer: COMMERCIAL

## 2024-01-29 VITALS
BODY MASS INDEX: 23.16 KG/M2 | HEIGHT: 65 IN | SYSTOLIC BLOOD PRESSURE: 124 MMHG | WEIGHT: 139 LBS | DIASTOLIC BLOOD PRESSURE: 72 MMHG

## 2024-01-29 DIAGNOSIS — R82.90 ABNORMAL URINE ODOR: ICD-10-CM

## 2024-01-29 DIAGNOSIS — R35.0 URINARY FREQUENCY: Primary | ICD-10-CM

## 2024-01-29 DIAGNOSIS — R30.0 DYSURIA: ICD-10-CM

## 2024-01-29 LAB
BILIRUBIN, URINE, POC: NORMAL
BLOOD URINE, POC: NORMAL
GLUCOSE URINE, POC: NORMAL
KETONES, URINE, POC: NORMAL
LEUKOCYTE ESTERASE, URINE, POC: NORMAL
NITRITE, URINE, POC: NEGATIVE
PH, URINE, POC: 6.5 (ref 4.6–8)
PROTEIN,URINE, POC: NORMAL
SPECIFIC GRAVITY, URINE, POC: 1 (ref 1–1.03)
URINALYSIS CLARITY, POC: NORMAL
URINALYSIS COLOR, POC: YELLOW
UROBILINOGEN, POC: NORMAL

## 2024-01-29 PROCEDURE — 99213 OFFICE O/P EST LOW 20 MIN: CPT | Performed by: NURSE PRACTITIONER

## 2024-01-29 PROCEDURE — 81003 URINALYSIS AUTO W/O SCOPE: CPT | Performed by: NURSE PRACTITIONER

## 2024-01-29 RX ORDER — SULFAMETHOXAZOLE AND TRIMETHOPRIM 800; 160 MG/1; MG/1
1 TABLET ORAL 2 TIMES DAILY
Qty: 14 TABLET | Refills: 0 | Status: SHIPPED | OUTPATIENT
Start: 2024-01-29 | End: 2024-02-05

## 2024-01-29 NOTE — PROGRESS NOTES
CC:   Chief Complaint   Patient presents with    Urinary Burning    urinary odor    Urinary Frequency       HPI:    Maryellen  is a 48 y.o. , , No LMP recorded. (Menstrual status: IUD).,  who is seen for c/o urinary frequency, dysuria and urinary odor. The patient states she started experiencing urinary frequency, dysuria and urinary odor this morning. She does report history of UTI's for the past 2 years and states \"I never had an issue before with UTI's.\" She reports experiencing vaginal spotting (\"brownish in color\"). [facility] she has had extensive workup including ultrasound and EMB which were all non remarkable. Denies any recent lifestyle changes in the last 2 years that could possibly cause UTI's.  has had 3 documented UTIs over the past 2 years. She denies fevers, chills, low back pain associated with symptoms, abnormal vaginal discharge, itching, irritation and urinary urgency today.     Denies changes in soaps or laundry detergents recently.     Contraception: IUD (replaced 6 years ago).     States for past year and a half, has experienced vaginal spotting (\"brownish in color\") with extensive workup by Dr. Overton and was unremarkable.     Sexually active with . No concerns for STDs-declines STD testing today.       GYN HISTORY:  As per HPI    Last Pap:  2023-Neg cotesting    Hx abnormal paps:             ASCUS neg HPV  2008        ASCUS neg HPV  2009        positive HPV  2010        positive HPV  2010     pap:  LGSIL  2011        Pap nl  2015:    Pap LGSIL  2015:    colpo mild dysplasia, neg ECC  2015:    Cryotherapy  2015     Elevated FSH 79, POF   2015:  Repeat FSH = 17 and pap: nl    Mammo:  2023: No mammographic evidence of malignancy. BI-RADS 1-Negative      Current Outpatient Medications on File Prior to Visit   Medication Sig Dispense Refill    diazePAM (VALIUM) 2 MG tablet Take 2-4 mg by mouth every 8 hours prn anxiety associated with flying. Initial staring

## 2024-02-01 LAB
BACTERIA SPEC CULT: ABNORMAL
SERVICE CMNT-IMP: ABNORMAL

## 2024-02-01 NOTE — RESULT ENCOUNTER NOTE
Urine Cx positive for E. Coli Bacteremia. Rx Bactrim sent into pharmacy during initial treatment and per sensitivity report will cover bacteria for treatment. Patient notified of results and to continue with abx therapy until complete.

## 2024-03-27 RX ORDER — ESTRADIOL 0.1 MG/G
1 CREAM VAGINAL DAILY
Qty: 42.5 G | Refills: 2 | Status: SHIPPED | OUTPATIENT
Start: 2024-03-27

## 2024-05-23 DIAGNOSIS — N92.1 BREAKTHROUGH BLEEDING ASSOCIATED WITH INTRAUTERINE DEVICE (IUD): ICD-10-CM

## 2024-05-23 DIAGNOSIS — Z97.5 BREAKTHROUGH BLEEDING ASSOCIATED WITH INTRAUTERINE DEVICE (IUD): ICD-10-CM

## 2024-05-23 RX ORDER — TRANEXAMIC ACID 650 MG/1
1300 TABLET ORAL 3 TIMES DAILY
Qty: 30 TABLET | Refills: 2 | Status: SHIPPED | OUTPATIENT
Start: 2024-05-23

## 2024-06-20 ENCOUNTER — OFFICE VISIT (OUTPATIENT)
Dept: OBGYN CLINIC | Age: 49
End: 2024-06-20
Payer: COMMERCIAL

## 2024-06-20 ENCOUNTER — PROCEDURE VISIT (OUTPATIENT)
Dept: OBGYN CLINIC | Age: 49
End: 2024-06-20
Payer: COMMERCIAL

## 2024-06-20 VITALS
HEIGHT: 65 IN | DIASTOLIC BLOOD PRESSURE: 62 MMHG | BODY MASS INDEX: 22.16 KG/M2 | WEIGHT: 133 LBS | SYSTOLIC BLOOD PRESSURE: 104 MMHG

## 2024-06-20 DIAGNOSIS — Z12.31 ENCOUNTER FOR SCREENING MAMMOGRAM FOR MALIGNANT NEOPLASM OF BREAST: ICD-10-CM

## 2024-06-20 DIAGNOSIS — Z97.5 BREAKTHROUGH BLEEDING ASSOCIATED WITH INTRAUTERINE DEVICE (IUD): Primary | ICD-10-CM

## 2024-06-20 DIAGNOSIS — E28.39 PREMATURE OVARIAN FAILURE: Primary | ICD-10-CM

## 2024-06-20 DIAGNOSIS — N92.1 BREAKTHROUGH BLEEDING ASSOCIATED WITH INTRAUTERINE DEVICE (IUD): Primary | ICD-10-CM

## 2024-06-20 PROCEDURE — 99214 OFFICE O/P EST MOD 30 MIN: CPT | Performed by: OBSTETRICS & GYNECOLOGY

## 2024-06-20 PROCEDURE — 76830 TRANSVAGINAL US NON-OB: CPT | Performed by: OBSTETRICS & GYNECOLOGY

## 2024-06-20 SDOH — ECONOMIC STABILITY: INCOME INSECURITY: HOW HARD IS IT FOR YOU TO PAY FOR THE VERY BASICS LIKE FOOD, HOUSING, MEDICAL CARE, AND HEATING?: NOT HARD AT ALL

## 2024-06-20 SDOH — ECONOMIC STABILITY: FOOD INSECURITY: WITHIN THE PAST 12 MONTHS, THE FOOD YOU BOUGHT JUST DIDN'T LAST AND YOU DIDN'T HAVE MONEY TO GET MORE.: NEVER TRUE

## 2024-06-20 SDOH — ECONOMIC STABILITY: FOOD INSECURITY: WITHIN THE PAST 12 MONTHS, YOU WORRIED THAT YOUR FOOD WOULD RUN OUT BEFORE YOU GOT MONEY TO BUY MORE.: NEVER TRUE

## 2024-06-20 ASSESSMENT — PATIENT HEALTH QUESTIONNAIRE - PHQ9
1. LITTLE INTEREST OR PLEASURE IN DOING THINGS: NOT AT ALL
SUM OF ALL RESPONSES TO PHQ9 QUESTIONS 1 & 2: 0
SUM OF ALL RESPONSES TO PHQ QUESTIONS 1-9: 0
2. FEELING DOWN, DEPRESSED OR HOPELESS: NOT AT ALL
SUM OF ALL RESPONSES TO PHQ QUESTIONS 1-9: 0

## 2024-06-20 NOTE — PROGRESS NOTES
status:      Spouse name: None    Number of children: None    Years of education: None    Highest education level: None   Tobacco Use    Smoking status: Never    Smokeless tobacco: Never    Tobacco comments:     Quit smoking: SOCIALLY   Vaping Use    Vaping Use: Never used   Substance and Sexual Activity    Alcohol use: Yes     Alcohol/week: 3.0 - 5.0 standard drinks of alcohol     Types: 3 - 5 Glasses of wine per week    Drug use: Never    Sexual activity: Yes     Partners: Male     Birth control/protection: I.U.D.     Comment: IUD   Social History Narrative    PCP-Dr Lyons (manages labs)  Derm- Dr Restrepo  GI-  [facility]    Daughter, Hossein ( 10/16/2001) also my patient     Social Determinants of Health     Financial Resource Strain: Low Risk  (2024)    Overall Financial Resource Strain (CARDIA)     Difficulty of Paying Living Expenses: Not hard at all   Food Insecurity: No Food Insecurity (2024)    Hunger Vital Sign     Worried About Running Out of Food in the Last Year: Never true     Ran Out of Food in the Last Year: Never true   Transportation Needs: Unknown (2024)    PRAPARE - Transportation     Lack of Transportation (Non-Medical): No   Housing Stability: Unknown (2024)    Housing Stability Vital Sign     Unstable Housing in the Last Year: No       Family History   Problem Relation Age of Onset    Colon Cancer Paternal Grandmother     Other Maternal Grandmother         ?  Stomach and/or female cancer    Osteoarthritis Maternal Grandmother     Stomach Cancer Maternal Grandmother     Cancer Maternal Grandmother     Stroke Maternal Grandfather     Prostate Cancer Maternal Grandfather     Asthma Maternal Grandfather     Diabetes Father     Colon Polyps Father     Skin Cancer Father     Thyroid Disease Mother     Osteoarthritis Mother     No Known Problems Brother     Breast Cancer Maternal Aunt         68/70    Stroke Maternal Aunt     Deep Vein Thrombosis Neg Hx     Ovarian Cancer

## 2024-07-19 ENCOUNTER — OFFICE VISIT (OUTPATIENT)
Dept: OBGYN CLINIC | Age: 49
End: 2024-07-19
Payer: COMMERCIAL

## 2024-07-19 VITALS
HEIGHT: 65 IN | BODY MASS INDEX: 22.99 KG/M2 | WEIGHT: 138 LBS | DIASTOLIC BLOOD PRESSURE: 66 MMHG | SYSTOLIC BLOOD PRESSURE: 110 MMHG

## 2024-07-19 DIAGNOSIS — E28.39 PREMATURE OVARIAN FAILURE: ICD-10-CM

## 2024-07-19 DIAGNOSIS — N92.1 MENORRHAGIA WITH IRREGULAR CYCLE: Primary | ICD-10-CM

## 2024-07-19 DIAGNOSIS — N94.6 SEVERE DYSMENORRHEA: ICD-10-CM

## 2024-07-19 PROCEDURE — 99215 OFFICE O/P EST HI 40 MIN: CPT | Performed by: OBSTETRICS & GYNECOLOGY

## 2024-07-19 ASSESSMENT — ENCOUNTER SYMPTOMS
BLOOD IN STOOL: 0
COUGH: 0
ABDOMINAL PAIN: 0
ALLERGIC/IMMUNOLOGIC NEGATIVE: 1
RESPIRATORY NEGATIVE: 1
GASTROINTESTINAL NEGATIVE: 1
SHORTNESS OF BREATH: 0
EYES NEGATIVE: 1

## 2024-07-19 NOTE — PROGRESS NOTES
surgery (2017); Dilation & curettage (2000); Umbilical hernia repair (2005); and Colposcopy..    Her current meds are   Current Outpatient Medications on File Prior to Visit   Medication Sig Dispense Refill    tranexamic acid (LYSTEDA) 650 MG TABS tablet Take 2 tablets by mouth 3 times daily Take 2 tablets by mouth 3 times daily for 4-5 days.  Do not take this medication for more than 5 days. 30 tablet 2    estradiol (ESTRACE) 0.1 MG/GM vaginal cream Place 1 g vaginally daily Insert 1 gram vaginally and a pea size externally nightly for 2 weeks then twice weekly 42.5 g 2    diazePAM (VALIUM) 2 MG tablet Take 2-4 mg by mouth every 8 hours prn anxiety associated with flying. Initial staring dose 2 mg, ok to increase to 4 mg if no significant symptom relief 10 tablet 0    estradiol (VIVELLE) 0.1 MG/24HR Place 1 patch onto the skin Twice a Week APPLY ONE PATCH TO THE SKIN TWO TIMES PER WEEK.  Change patch on Sunday and Wednesday or Monday and Thursday or Tuesday and Friday. 24 patch 4    Calcium Carbonate-Vitamin D (CALCIUM-VITAMIN D) 600-125 MG-UNIT TABS Take by mouth      ferrous sulfate (FE TABS 325) 325 (65 Fe) MG EC tablet Take 1 tablet by mouth 3 times daily (with meals)      levonorgestrel (MIRENA) IUD 52 mg 1 each by IntraUTERine route once       No current facility-administered medications on file prior to visit.         No Known Allergies     Family history is significant for family history includes Asthma in her maternal grandfather; Breast Cancer in her maternal aunt; Cancer in her maternal grandmother; Colon Cancer in her paternal grandmother; Colon Polyps in her father; Diabetes in her father; No Known Problems in her brother; Osteoarthritis in her maternal grandmother and mother; Other in her maternal grandmother; Prostate Cancer in her maternal grandfather; Skin Cancer in her father; Stomach Cancer in her maternal grandmother; Stroke in her maternal aunt and maternal grandfather; Thyroid Disease in her

## 2024-07-22 ENCOUNTER — PREP FOR PROCEDURE (OUTPATIENT)
Dept: OBGYN CLINIC | Age: 49
End: 2024-07-22

## 2024-07-22 ENCOUNTER — TELEPHONE (OUTPATIENT)
Dept: OBGYN CLINIC | Age: 49
End: 2024-07-22

## 2024-07-22 DIAGNOSIS — N95.0 POSTMENOPAUSAL BLEEDING: ICD-10-CM

## 2024-07-22 NOTE — TELEPHONE ENCOUNTER
Attempted to contact patient to discuss possible dates for recommended surgery per Dr Joesph ([facility]). N/A, L/M on voicemail for patient to return my call at her earliest convenience.

## 2024-07-23 PROBLEM — N92.0 MENORRHAGIA: Status: ACTIVE | Noted: 2024-07-22

## 2024-07-23 PROBLEM — N95.0 POSTMENOPAUSAL BLEEDING: Status: ACTIVE | Noted: 2024-07-22

## 2024-08-20 ENCOUNTER — PATIENT MESSAGE (OUTPATIENT)
Dept: OBGYN CLINIC | Age: 49
End: 2024-08-20

## 2024-08-29 ENCOUNTER — TELEPHONE (OUTPATIENT)
Dept: OBGYN CLINIC | Age: 49
End: 2024-08-29

## 2024-08-29 RX ORDER — FLUCONAZOLE 150 MG/1
150 TABLET ORAL
Qty: 2 TABLET | Refills: 0 | Status: SHIPPED | OUTPATIENT
Start: 2024-08-29 | End: 2024-09-04

## 2024-08-30 ENCOUNTER — HOSPITAL ENCOUNTER (OUTPATIENT)
Dept: MAMMOGRAPHY | Age: 49
Discharge: HOME | End: 2024-08-30
Attending: OBSTETRICS & GYNECOLOGY
Payer: COMMERCIAL

## 2024-08-30 DIAGNOSIS — Z12.31 ENCOUNTER FOR SCREENING MAMMOGRAM FOR MALIGNANT NEOPLASM OF BREAST: ICD-10-CM

## 2024-08-30 PROCEDURE — 77063 BREAST TOMOSYNTHESIS BI: CPT

## 2024-09-03 ENCOUNTER — TELEPHONE (OUTPATIENT)
Dept: OBGYN CLINIC | Age: 49
End: 2024-09-03

## 2024-09-03 ENCOUNTER — OFFICE VISIT (OUTPATIENT)
Dept: OBGYN CLINIC | Age: 49
End: 2024-09-03
Payer: COMMERCIAL

## 2024-09-03 VITALS
WEIGHT: 137 LBS | SYSTOLIC BLOOD PRESSURE: 126 MMHG | DIASTOLIC BLOOD PRESSURE: 74 MMHG | HEIGHT: 65 IN | BODY MASS INDEX: 22.82 KG/M2

## 2024-09-03 DIAGNOSIS — N76.0 VAGINITIS AND VULVOVAGINITIS: ICD-10-CM

## 2024-09-03 DIAGNOSIS — N89.8 VAGINAL ODOR: ICD-10-CM

## 2024-09-03 DIAGNOSIS — N89.8 VAGINAL ITCHING: Primary | ICD-10-CM

## 2024-09-03 PROCEDURE — 99213 OFFICE O/P EST LOW 20 MIN: CPT | Performed by: OBSTETRICS & GYNECOLOGY

## 2024-09-03 PROCEDURE — 99459 PELVIC EXAMINATION: CPT | Performed by: OBSTETRICS & GYNECOLOGY

## 2024-09-03 RX ORDER — TERCONAZOLE 8 MG/G
1 CREAM VAGINAL NIGHTLY
Qty: 1 EACH | Refills: 0 | Status: SHIPPED | OUTPATIENT
Start: 2024-09-03 | End: 2024-09-06

## 2024-09-03 RX ORDER — FLUCONAZOLE 150 MG/1
150 TABLET ORAL
Qty: 2 TABLET | Refills: 0 | Status: SHIPPED | OUTPATIENT
Start: 2024-09-03 | End: 2024-09-06

## 2024-09-03 NOTE — PROGRESS NOTES
adjust if BV  -     Nuswab Vaginitis (VG); Future  -     Nuswab Vaginitis (VG)    Vaginal odor  -     Nuswab Vaginitis (VG); Future  -     Nuswab Vaginitis (VG)    Vaginitis and vulvovaginitis  -     terconazole (TERAZOL 3) 0.8 % vaginal cream; Place 1 applicator vaginally nightly for 3 days Place vaginally nightly.     Also sent an Rx for Diflucan to take with her to [facility] prn.    Return if symptoms worsen or fail to improve.    Chandler Joseph MD

## 2024-09-06 LAB
A VAGINAE DNA VAG QL NAA+PROBE: NORMAL SCORE
BVAB2 DNA VAG QL NAA+PROBE: NORMAL SCORE
C ALBICANS DNA VAG QL NAA+PROBE: NEGATIVE
C GLABRATA DNA VAG QL NAA+PROBE: NEGATIVE
MEGA1 DNA VAG QL NAA+PROBE: NORMAL SCORE
SPECIMEN SOURCE: NORMAL
T VAGINALIS RRNA SPEC QL NAA+PROBE: NEGATIVE

## 2024-09-24 NOTE — PROGRESS NOTES
Enhanced Recovery After GYN Surgery: non-diabetic patients    It is highly recommended you purchase and drink Ensure Complete - one bottle twice daily for five days starting on 11/15/24. Ensure Complete is the preferred formula over other Ensure formulas. It is recommended that you continue drinking this for one month after surgery.    The night before surgery 11/20/24, drink 2 bottles of the Ensure Pre-Surgery drink.     The morning of surgery 11/21/24, drink one bottle of the Ensure Pre-Surgery drink 2 hours prior to your arrival to the hospital. Drink this over 5-10 minutes.    Drink nothing else after drinking the pre-surgical drink the morning of surgery.    Bring your patient handbook with you to the hospital.    Things to remember:    1. You will be given clear liquids to drink, advancing diet as tolerated    2. You will be up and moving around with assistance 2-4 hours after surgery.    3. You will be given regularly scheduled pain medications (NSAIDS, Tylenol) with narcotics as needed.    4. You may be able to go home that night if the surgeon okays and you are up and eating and drinking. Otherwise, your discharge will be the following morning around lunch time.     5. Continue drinking Ensure Complete for 5 days after surgery.

## 2024-11-06 ENCOUNTER — HOSPITAL ENCOUNTER (OUTPATIENT)
Dept: SURGERY | Age: 49
Discharge: HOME | End: 2024-11-09
Payer: COMMERCIAL

## 2024-11-06 DIAGNOSIS — Z01.818 PRE-OP TESTING: ICD-10-CM

## 2024-11-06 LAB — HGB BLD-MCNC: 12.5 G/DL (ref 11.7–15.4)

## 2024-11-06 PROCEDURE — 36415 COLL VENOUS BLD VENIPUNCTURE: CPT

## 2024-11-06 PROCEDURE — 85018 HEMOGLOBIN: CPT

## 2024-11-07 ENCOUNTER — OFFICE VISIT (OUTPATIENT)
Dept: OBGYN CLINIC | Age: 49
End: 2024-11-07

## 2024-11-07 VITALS
WEIGHT: 139 LBS | DIASTOLIC BLOOD PRESSURE: 68 MMHG | BODY MASS INDEX: 23.16 KG/M2 | HEIGHT: 65 IN | SYSTOLIC BLOOD PRESSURE: 100 MMHG

## 2024-11-07 DIAGNOSIS — N95.0 POSTMENOPAUSAL BLEEDING: ICD-10-CM

## 2024-11-07 DIAGNOSIS — N92.1 MENORRHAGIA WITH IRREGULAR CYCLE: Primary | ICD-10-CM

## 2024-11-07 NOTE — H&P (VIEW-ONLY)
Maryellen Drew [facility] is a 49 y.o. White (non-) female,  referred by Dr. ECHEVARRIA for possible surgery due to irregular menstrual cycle and post menopausal bleeding.  Started over 1.5 yrs ago. Diagnosed with premature ovarian failure 8-9 yrs ago.  It waxed and waned, but FSH levels went back up again about a year ago.  Has been on E patch for 10yrs, and has continued to use the IUD which she first started ~ 15yrs ago.  Now  with this mirena 5-6 yrs ago. Symptoms are are worsening. EMB ~ 1.5yrs ago was benign.  US obtained 2-3 months ago shows NL size uterus.  ES was ~5.5mm.  Ovaries WNL.    Previous treatments: hormone therapy, nonsteroidal anti-inflammatory drugs (NSAID's), and progesterone intrauterine device    All relevant previous notes, labs and/or imaging performed by Dr. ECHEVARRIA were reviewed and confirmed with pt today.  I interpreted these results and D/W pt my opinion and recommendations accordingly.     Maryellen  has a past medical history of Abnormal Pap smear of cervix, Contraception, Ectopic pregnancy, Exercise induced bronchospasm, Family history of malignant neoplasm of colon, History of colonoscopy, Menopausal symptoms, Multiple thyroid nodules, and Premature ovarian failure. .    OB History    Para Term  AB Living   4 3 3   1 2   SAB IAB Ectopic Molar Multiple Live Births       1     2      # Outcome Date GA Lbr Be/2nd Weight Sex Type Anes PTL Lv   4 Term 10/04/07 40w0d 03:00 3.515 kg (7 lb 12 oz) F  EPI  ADELA   3 Term 04 40w0d 04:00 3.289 kg (7 lb 4 oz) M    ADELA   2 Ectopic 03           1 Term 10/16/01 40w0d 05:00 2.92 kg (6 lb 7 oz) F  EPI        Obstetric Comments     girl, Hossein    ectopic   2004 boy, Clarence    girl, Christopher Sanchez         Her surgeries include  has a past surgical history that includes Dilation and curettage of uterus (); hernia repair (); Colonoscopy (); implant breast silicone/eq (Bilateral, ); Breast

## 2024-11-07 NOTE — PROGRESS NOTES
Maryellen Drew [facility] is a 49 y.o. White (non-) female,  referred by Dr. ECHEVARRIA for possible surgery due to irregular menstrual cycle and post menopausal bleeding.  Started over 1.5 yrs ago. Diagnosed with premature ovarian failure 8-9 yrs ago.  It waxed and waned, but FSH levels went back up again about a year ago.  Has been on E patch for 10yrs, and has continued to use the IUD which she first started ~ 15yrs ago.  Now  with this mirena 5-6 yrs ago. Symptoms are are worsening. EMB ~ 1.5yrs ago was benign.  US obtained 2-3 months ago shows NL size uterus.  ES was ~5.5mm.  Ovaries WNL.    Previous treatments: hormone therapy, nonsteroidal anti-inflammatory drugs (NSAID's), and progesterone intrauterine device    All relevant previous notes, labs and/or imaging performed by Dr. ECHEVARRIA were reviewed and confirmed with pt today.  I interpreted these results and D/W pt my opinion and recommendations accordingly.     Maryellen  has a past medical history of Abnormal Pap smear of cervix, Contraception, Ectopic pregnancy, Exercise induced bronchospasm, Family history of malignant neoplasm of colon, History of colonoscopy, Menopausal symptoms, Multiple thyroid nodules, and Premature ovarian failure. .    OB History    Para Term  AB Living   4 3 3   1 2   SAB IAB Ectopic Molar Multiple Live Births       1     2      # Outcome Date GA Lbr Be/2nd Weight Sex Type Anes PTL Lv   4 Term 10/04/07 40w0d 03:00 3.515 kg (7 lb 12 oz) F  EPI  ADELA   3 Term 04 40w0d 04:00 3.289 kg (7 lb 4 oz) M    ADELA   2 Ectopic 03           1 Term 10/16/01 40w0d 05:00 2.92 kg (6 lb 7 oz) F  EPI        Obstetric Comments     girl, Hossein    ectopic   2004 boy, Clarence    girl, Christopher Sanchez         Her surgeries include  has a past surgical history that includes Dilation and curettage of uterus (); hernia repair (); Colonoscopy (); implant breast silicone/eq (Bilateral, ); Breast

## 2024-11-11 NOTE — PROGRESS NOTES
Patient verified name and     Order for consent not found in EHR ; patient verified.     Type 2 surgery    Labs per surgeon: none; orders not received.  Labs per anesthesia protocol: hgb  EKG: not needed per protocols.       Patient attended GYN class on 24 at which time labs were drawn. Patient  also received all patient education and if staying overnight will receive hospital approved surgical skin cleanser; if not, patient will use non-moisturizing soap.    Patient instructed to hold all vitamins 7 days prior to surgery and NSAIDS 5 days prior to surgery, patient verbalized understanding.    Patient instructed to continue previous medications as prescribed prior to surgery and to take the following medications the day of surgery according to anesthesia guidelines with a small sip of water: Estradiol patch.     Patient answered medical/surgical history questions at their best of ability. All prior to admission medications documented in [facility].

## 2024-11-19 ENCOUNTER — TELEPHONE (OUTPATIENT)
Dept: OBGYN CLINIC | Age: 49
End: 2024-11-19

## 2024-11-19 NOTE — TELEPHONE ENCOUNTER
Pt LVM stating that she has her annual scheduled with Dr. Overton on 12/17, but has a hysterectomy scheduled with Dr. Joseph this Thursday. She is wondering if she still needs to keep her annual appt.    Verified via [employer] msg.

## 2024-11-20 ENCOUNTER — ANESTHESIA EVENT (OUTPATIENT)
Dept: SURGERY | Age: 49
End: 2024-11-20
Payer: COMMERCIAL

## 2024-11-20 ENCOUNTER — PREP FOR PROCEDURE (OUTPATIENT)
Dept: OBGYN CLINIC | Age: 49
End: 2024-11-20

## 2024-11-20 RX ORDER — SODIUM CHLORIDE 9 MG/ML
INJECTION, SOLUTION INTRAVENOUS PRN
Status: CANCELLED | OUTPATIENT
Start: 2024-11-20

## 2024-11-20 RX ORDER — SODIUM CHLORIDE 0.9 % (FLUSH) 0.9 %
5-40 SYRINGE (ML) INJECTION EVERY 12 HOURS SCHEDULED
Status: CANCELLED | OUTPATIENT
Start: 2024-11-20

## 2024-11-20 RX ORDER — SODIUM CHLORIDE 0.9 % (FLUSH) 0.9 %
5-40 SYRINGE (ML) INJECTION PRN
Status: CANCELLED | OUTPATIENT
Start: 2024-11-20

## 2024-11-20 RX ORDER — ACETAMINOPHEN 325 MG/1
1000 TABLET ORAL ONCE
Status: CANCELLED | OUTPATIENT
Start: 2024-11-20 | End: 2024-11-20

## 2024-11-20 RX ORDER — MIDAZOLAM HYDROCHLORIDE 2 MG/2ML
2 INJECTION, SOLUTION INTRAMUSCULAR; INTRAVENOUS
Status: CANCELLED | OUTPATIENT
Start: 2024-11-20 | End: 2024-11-21

## 2024-11-20 ASSESSMENT — ENCOUNTER SYMPTOMS
GASTROINTESTINAL NEGATIVE: 1
ALLERGIC/IMMUNOLOGIC NEGATIVE: 1
SHORTNESS OF BREATH: 0
ABDOMINAL PAIN: 0
RESPIRATORY NEGATIVE: 1
EYES NEGATIVE: 1
BLOOD IN STOOL: 0
COUGH: 0

## 2024-11-21 ENCOUNTER — ANESTHESIA (OUTPATIENT)
Dept: SURGERY | Age: 49
End: 2024-11-21
Payer: COMMERCIAL

## 2024-11-21 ENCOUNTER — HOSPITAL ENCOUNTER (OUTPATIENT)
Age: 49
Discharge: HOME | End: 2024-11-21
Attending: OBSTETRICS & GYNECOLOGY | Admitting: OBSTETRICS & GYNECOLOGY
Payer: COMMERCIAL

## 2024-11-21 VITALS
SYSTOLIC BLOOD PRESSURE: 107 MMHG | HEART RATE: 50 BPM | TEMPERATURE: 98.4 F | BODY MASS INDEX: 23.53 KG/M2 | HEIGHT: 65 IN | RESPIRATION RATE: 18 BRPM | DIASTOLIC BLOOD PRESSURE: 62 MMHG | OXYGEN SATURATION: 100 % | WEIGHT: 141.2 LBS

## 2024-11-21 DIAGNOSIS — Z90.710 S/P HYSTERECTOMY: ICD-10-CM

## 2024-11-21 DIAGNOSIS — Z01.818 PRE-OP TESTING: Primary | ICD-10-CM

## 2024-11-21 LAB
ABO + RH BLD: NORMAL
BLOOD GROUP ANTIBODIES SERPL: NORMAL
HCG UR QL: NEGATIVE
SPECIMEN EXP DATE BLD: NORMAL

## 2024-11-21 PROCEDURE — 2580000003 HC RX 258: Performed by: OBSTETRICS & GYNECOLOGY

## 2024-11-21 PROCEDURE — 86901 BLOOD TYPING SEROLOGIC RH(D): CPT

## 2024-11-21 PROCEDURE — 3600000009 HC SURGERY ROBOT BASE: Performed by: OBSTETRICS & GYNECOLOGY

## 2024-11-21 PROCEDURE — 81025 URINE PREGNANCY TEST: CPT

## 2024-11-21 PROCEDURE — 58571 TLH W/T/O 250 G OR LESS: CPT | Performed by: OBSTETRICS & GYNECOLOGY

## 2024-11-21 PROCEDURE — 7100000000 HC PACU RECOVERY - FIRST 15 MIN: Performed by: OBSTETRICS & GYNECOLOGY

## 2024-11-21 PROCEDURE — 3700000000 HC ANESTHESIA ATTENDED CARE: Performed by: OBSTETRICS & GYNECOLOGY

## 2024-11-21 PROCEDURE — 2580000003 HC RX 258: Performed by: ANESTHESIOLOGY

## 2024-11-21 PROCEDURE — 2500000003 HC RX 250 WO HCPCS: Performed by: NURSE ANESTHETIST, CERTIFIED REGISTERED

## 2024-11-21 PROCEDURE — 7100000001 HC PACU RECOVERY - ADDTL 15 MIN: Performed by: OBSTETRICS & GYNECOLOGY

## 2024-11-21 PROCEDURE — 6360000002 HC RX W HCPCS: Performed by: OBSTETRICS & GYNECOLOGY

## 2024-11-21 PROCEDURE — S2900 ROBOTIC SURGICAL SYSTEM: HCPCS | Performed by: OBSTETRICS & GYNECOLOGY

## 2024-11-21 PROCEDURE — 2709999900 HC NON-CHARGEABLE SUPPLY: Performed by: OBSTETRICS & GYNECOLOGY

## 2024-11-21 PROCEDURE — 7100000010 HC PHASE II RECOVERY - FIRST 15 MIN: Performed by: OBSTETRICS & GYNECOLOGY

## 2024-11-21 PROCEDURE — 86850 RBC ANTIBODY SCREEN: CPT

## 2024-11-21 PROCEDURE — 3600000019 HC SURGERY ROBOT ADDTL 15MIN: Performed by: OBSTETRICS & GYNECOLOGY

## 2024-11-21 PROCEDURE — 6360000002 HC RX W HCPCS: Performed by: NURSE ANESTHETIST, CERTIFIED REGISTERED

## 2024-11-21 PROCEDURE — 86900 BLOOD TYPING SEROLOGIC ABO: CPT

## 2024-11-21 PROCEDURE — 7100000011 HC PHASE II RECOVERY - ADDTL 15 MIN: Performed by: OBSTETRICS & GYNECOLOGY

## 2024-11-21 PROCEDURE — 88307 TISSUE EXAM BY PATHOLOGIST: CPT

## 2024-11-21 PROCEDURE — 2720000010 HC SURG SUPPLY STERILE: Performed by: OBSTETRICS & GYNECOLOGY

## 2024-11-21 PROCEDURE — 6370000000 HC RX 637 (ALT 250 FOR IP): Performed by: OBSTETRICS & GYNECOLOGY

## 2024-11-21 PROCEDURE — 3700000001 HC ADD 15 MINUTES (ANESTHESIA): Performed by: OBSTETRICS & GYNECOLOGY

## 2024-11-21 RX ORDER — ROCURONIUM BROMIDE 10 MG/ML
INJECTION, SOLUTION INTRAVENOUS
Status: DISCONTINUED | OUTPATIENT
Start: 2024-11-21 | End: 2024-11-21 | Stop reason: SDUPTHER

## 2024-11-21 RX ORDER — KETAMINE HCL IN NACL, ISO-OSM 20 MG/2 ML
SYRINGE (ML) INJECTION
Status: DISCONTINUED | OUTPATIENT
Start: 2024-11-21 | End: 2024-11-21 | Stop reason: SDUPTHER

## 2024-11-21 RX ORDER — EPHEDRINE SULFATE 5 MG/ML
INJECTION INTRAVENOUS
Status: DISCONTINUED | OUTPATIENT
Start: 2024-11-21 | End: 2024-11-21 | Stop reason: SDUPTHER

## 2024-11-21 RX ORDER — SODIUM CHLORIDE 9 MG/ML
INJECTION, SOLUTION INTRAVENOUS PRN
Status: DISCONTINUED | OUTPATIENT
Start: 2024-11-21 | End: 2024-11-21 | Stop reason: HOSPADM

## 2024-11-21 RX ORDER — SODIUM CHLORIDE 0.9 % (FLUSH) 0.9 %
5-40 SYRINGE (ML) INJECTION EVERY 12 HOURS SCHEDULED
Status: DISCONTINUED | OUTPATIENT
Start: 2024-11-21 | End: 2024-11-21 | Stop reason: HOSPADM

## 2024-11-21 RX ORDER — SODIUM CHLORIDE 0.9 % (FLUSH) 0.9 %
5-40 SYRINGE (ML) INJECTION PRN
Status: DISCONTINUED | OUTPATIENT
Start: 2024-11-21 | End: 2024-11-21 | Stop reason: HOSPADM

## 2024-11-21 RX ORDER — DEXAMETHASONE SODIUM PHOSPHATE 10 MG/ML
INJECTION INTRAMUSCULAR; INTRAVENOUS
Status: DISCONTINUED | OUTPATIENT
Start: 2024-11-21 | End: 2024-11-21 | Stop reason: SDUPTHER

## 2024-11-21 RX ORDER — NALOXONE HYDROCHLORIDE 0.4 MG/ML
INJECTION, SOLUTION INTRAMUSCULAR; INTRAVENOUS; SUBCUTANEOUS PRN
Status: DISCONTINUED | OUTPATIENT
Start: 2024-11-21 | End: 2024-11-21 | Stop reason: HOSPADM

## 2024-11-21 RX ORDER — MIDAZOLAM HYDROCHLORIDE 1 MG/ML
INJECTION, SOLUTION INTRAMUSCULAR; INTRAVENOUS
Status: DISCONTINUED | OUTPATIENT
Start: 2024-11-21 | End: 2024-11-21 | Stop reason: SDUPTHER

## 2024-11-21 RX ORDER — HYDROMORPHONE HYDROCHLORIDE 2 MG/ML
INJECTION, SOLUTION INTRAMUSCULAR; INTRAVENOUS; SUBCUTANEOUS
Status: DISCONTINUED | OUTPATIENT
Start: 2024-11-21 | End: 2024-11-21 | Stop reason: SDUPTHER

## 2024-11-21 RX ORDER — OXYCODONE HYDROCHLORIDE 5 MG/1
10 TABLET ORAL PRN
Status: DISCONTINUED | OUTPATIENT
Start: 2024-11-21 | End: 2024-11-21 | Stop reason: HOSPADM

## 2024-11-21 RX ORDER — LIDOCAINE HYDROCHLORIDE 20 MG/ML
INJECTION, SOLUTION EPIDURAL; INFILTRATION; INTRACAUDAL; PERINEURAL
Status: DISCONTINUED | OUTPATIENT
Start: 2024-11-21 | End: 2024-11-21 | Stop reason: SDUPTHER

## 2024-11-21 RX ORDER — ONDANSETRON 2 MG/ML
4 INJECTION INTRAMUSCULAR; INTRAVENOUS
Status: DISCONTINUED | OUTPATIENT
Start: 2024-11-21 | End: 2024-11-21 | Stop reason: HOSPADM

## 2024-11-21 RX ORDER — ACETAMINOPHEN 500 MG
1000 TABLET ORAL ONCE
Status: COMPLETED | OUTPATIENT
Start: 2024-11-21 | End: 2024-11-21

## 2024-11-21 RX ORDER — SODIUM CHLORIDE, SODIUM LACTATE, POTASSIUM CHLORIDE, CALCIUM CHLORIDE 600; 310; 30; 20 MG/100ML; MG/100ML; MG/100ML; MG/100ML
INJECTION, SOLUTION INTRAVENOUS CONTINUOUS
Status: DISCONTINUED | OUTPATIENT
Start: 2024-11-21 | End: 2024-11-21 | Stop reason: HOSPADM

## 2024-11-21 RX ORDER — ACETAMINOPHEN 500 MG
1000 TABLET ORAL ONCE
Status: DISCONTINUED | OUTPATIENT
Start: 2024-11-21 | End: 2024-11-21

## 2024-11-21 RX ORDER — NEOSTIGMINE METHYLSULFATE 1 MG/ML
INJECTION INTRAVENOUS
Status: DISCONTINUED | OUTPATIENT
Start: 2024-11-21 | End: 2024-11-21 | Stop reason: SDUPTHER

## 2024-11-21 RX ORDER — GLYCOPYRROLATE 0.2 MG/ML
INJECTION INTRAMUSCULAR; INTRAVENOUS
Status: DISCONTINUED | OUTPATIENT
Start: 2024-11-21 | End: 2024-11-21 | Stop reason: SDUPTHER

## 2024-11-21 RX ORDER — IBUPROFEN 800 MG/1
800 TABLET, FILM COATED ORAL EVERY 8 HOURS PRN
Qty: 60 TABLET | Refills: 0 | Status: SHIPPED | OUTPATIENT
Start: 2024-11-21

## 2024-11-21 RX ORDER — PROPOFOL 10 MG/ML
INJECTION, EMULSION INTRAVENOUS
Status: DISCONTINUED | OUTPATIENT
Start: 2024-11-21 | End: 2024-11-21 | Stop reason: SDUPTHER

## 2024-11-21 RX ORDER — OXYCODONE HYDROCHLORIDE 5 MG/1
5 TABLET ORAL PRN
Status: DISCONTINUED | OUTPATIENT
Start: 2024-11-21 | End: 2024-11-21 | Stop reason: HOSPADM

## 2024-11-21 RX ORDER — PROCHLORPERAZINE EDISYLATE 5 MG/ML
5 INJECTION INTRAMUSCULAR; INTRAVENOUS
Status: DISCONTINUED | OUTPATIENT
Start: 2024-11-21 | End: 2024-11-21 | Stop reason: HOSPADM

## 2024-11-21 RX ORDER — FENTANYL CITRATE 50 UG/ML
INJECTION, SOLUTION INTRAMUSCULAR; INTRAVENOUS
Status: DISCONTINUED | OUTPATIENT
Start: 2024-11-21 | End: 2024-11-21 | Stop reason: SDUPTHER

## 2024-11-21 RX ORDER — OXYCODONE HYDROCHLORIDE 5 MG/1
5 TABLET ORAL EVERY 4 HOURS PRN
Qty: 15 TABLET | Refills: 0 | Status: SHIPPED | OUTPATIENT
Start: 2024-11-21 | End: 2024-11-26

## 2024-11-21 RX ORDER — ACETAMINOPHEN 500 MG
1000 TABLET ORAL EVERY 6 HOURS PRN
COMMUNITY

## 2024-11-21 RX ORDER — TRANEXAMIC ACID 100 MG/ML
INJECTION, SOLUTION INTRAVENOUS
Status: DISCONTINUED | OUTPATIENT
Start: 2024-11-21 | End: 2024-11-21 | Stop reason: SDUPTHER

## 2024-11-21 RX ORDER — DIPHENHYDRAMINE HYDROCHLORIDE 50 MG/ML
12.5 INJECTION INTRAMUSCULAR; INTRAVENOUS
Status: DISCONTINUED | OUTPATIENT
Start: 2024-11-21 | End: 2024-11-21 | Stop reason: HOSPADM

## 2024-11-21 RX ORDER — ONDANSETRON 2 MG/ML
INJECTION INTRAMUSCULAR; INTRAVENOUS
Status: DISCONTINUED | OUTPATIENT
Start: 2024-11-21 | End: 2024-11-21 | Stop reason: SDUPTHER

## 2024-11-21 RX ORDER — LIDOCAINE HYDROCHLORIDE 10 MG/ML
1 INJECTION, SOLUTION INFILTRATION; PERINEURAL
Status: DISCONTINUED | OUTPATIENT
Start: 2024-11-21 | End: 2024-11-21 | Stop reason: HOSPADM

## 2024-11-21 RX ORDER — SODIUM CHLORIDE 9 MG/ML
INJECTION, SOLUTION INTRAVENOUS PRN
Status: DISCONTINUED | OUTPATIENT
Start: 2024-11-21 | End: 2024-11-21

## 2024-11-21 RX ORDER — BUPIVACAINE HYDROCHLORIDE 5 MG/ML
INJECTION, SOLUTION EPIDURAL; INTRACAUDAL PRN
Status: DISCONTINUED | OUTPATIENT
Start: 2024-11-21 | End: 2024-11-21 | Stop reason: HOSPADM

## 2024-11-21 RX ORDER — ONDANSETRON 4 MG/1
4 TABLET, ORALLY DISINTEGRATING ORAL EVERY 8 HOURS PRN
Qty: 12 TABLET | Refills: 0 | Status: SHIPPED | OUTPATIENT
Start: 2024-11-21

## 2024-11-21 RX ADMIN — Medication 20 MG: at 10:29

## 2024-11-21 RX ADMIN — EPHEDRINE SULFATE 10 MG: 5 INJECTION INTRAVENOUS at 10:27

## 2024-11-21 RX ADMIN — SODIUM CHLORIDE, SODIUM LACTATE, POTASSIUM CHLORIDE, AND CALCIUM CHLORIDE: 600; 310; 30; 20 INJECTION, SOLUTION INTRAVENOUS at 09:50

## 2024-11-21 RX ADMIN — ONDANSETRON 4 MG: 2 INJECTION, SOLUTION INTRAMUSCULAR; INTRAVENOUS at 10:15

## 2024-11-21 RX ADMIN — FENTANYL CITRATE 50 MCG: 50 INJECTION, SOLUTION INTRAMUSCULAR; INTRAVENOUS at 10:36

## 2024-11-21 RX ADMIN — CEFAZOLIN 2000 MG: 2 INJECTION, POWDER, FOR SOLUTION INTRAMUSCULAR; INTRAVENOUS at 10:26

## 2024-11-21 RX ADMIN — PROPOFOL 200 MG: 10 INJECTION, EMULSION INTRAVENOUS at 10:04

## 2024-11-21 RX ADMIN — ROCURONIUM BROMIDE 40 MG: 10 INJECTION, SOLUTION INTRAVENOUS at 10:04

## 2024-11-21 RX ADMIN — HYDROMORPHONE HYDROCHLORIDE 0.5 MG: 2 INJECTION INTRAMUSCULAR; INTRAVENOUS; SUBCUTANEOUS at 10:43

## 2024-11-21 RX ADMIN — NEOSTIGMINE METHYLSULFATE 4 MG: 1 INJECTION, SOLUTION INTRAVENOUS at 11:46

## 2024-11-21 RX ADMIN — ROCURONIUM BROMIDE 10 MG: 10 INJECTION, SOLUTION INTRAVENOUS at 11:07

## 2024-11-21 RX ADMIN — GLYCOPYRROLATE 0.1 MG: 0.2 INJECTION INTRAMUSCULAR; INTRAVENOUS at 10:24

## 2024-11-21 RX ADMIN — GLYCOPYRROLATE 0.6 MG: 0.2 INJECTION INTRAMUSCULAR; INTRAVENOUS at 11:46

## 2024-11-21 RX ADMIN — DEXAMETHASONE SODIUM PHOSPHATE 7 MG: 10 INJECTION INTRAMUSCULAR; INTRAVENOUS at 10:17

## 2024-11-21 RX ADMIN — ROCURONIUM BROMIDE 10 MG: 10 INJECTION, SOLUTION INTRAVENOUS at 10:27

## 2024-11-21 RX ADMIN — ACETAMINOPHEN 1000 MG: 500 TABLET, FILM COATED ORAL at 08:45

## 2024-11-21 RX ADMIN — LIDOCAINE HYDROCHLORIDE 100 MG: 20 INJECTION, SOLUTION EPIDURAL; INFILTRATION; INTRACAUDAL; PERINEURAL at 10:03

## 2024-11-21 RX ADMIN — MIDAZOLAM 2 MG: 1 INJECTION INTRAMUSCULAR; INTRAVENOUS at 09:53

## 2024-11-21 RX ADMIN — FENTANYL CITRATE 50 MCG: 50 INJECTION, SOLUTION INTRAMUSCULAR; INTRAVENOUS at 10:03

## 2024-11-21 RX ADMIN — PROPOFOL 20 MG: 10 INJECTION, EMULSION INTRAVENOUS at 11:45

## 2024-11-21 RX ADMIN — TRANEXAMIC ACID 1000 MG: 100 INJECTION, SOLUTION INTRAVENOUS at 11:43

## 2024-11-21 RX ADMIN — HYDROMORPHONE HYDROCHLORIDE 0.25 MG: 2 INJECTION INTRAMUSCULAR; INTRAVENOUS; SUBCUTANEOUS at 10:57

## 2024-11-21 ASSESSMENT — PAIN SCALES - GENERAL
PAINLEVEL_OUTOF10: 3

## 2024-11-21 ASSESSMENT — PAIN - FUNCTIONAL ASSESSMENT: PAIN_FUNCTIONAL_ASSESSMENT: 0-10

## 2024-11-21 NOTE — PERIOP NOTE
Patient to void 300 cc. Pt VSS stable. Pain and Nausea controlled at this time. Verbal sign out per MD garcia when pacu care is completed. Plan of care continues.

## 2024-11-21 NOTE — ANESTHESIA PRE PROCEDURE
Department of Anesthesiology  Preprocedure Note       Name:  Maryellen Drew [facility]   Age:  49 y.o.  :  1975                                          MRN:  [id number]         Date:  2024      Surgeon: Surgeon(s):  Chandler Joseph MD    Procedure: Procedure(s):  ERAS ROBOTIC ASSISTED TOTAL LAPAROSCOPIC HYSTERECTOMY WITH BILATERAL SALPINGOOPHERECTOMY    Medications prior to admission:   Prior to Admission medications    Medication Sig Start Date End Date Taking? Authorizing Provider   estradiol (ESTRACE) 0.1 MG/GM vaginal cream Place 1 g vaginally daily Insert 1 gram vaginally and a pea size externally nightly for 2 weeks then twice weekly 3/27/24  Yes Mahsa Overton MD   estradiol (VIVELLE) 0.1 MG/24HR Place 1 patch onto the skin Twice a Week APPLY ONE PATCH TO THE SKIN TWO TIMES PER WEEK.  Change patch on  and Wednesday or Monday and Thursday or Tuesday and Friday. 23  Yes Mahsa Overton MD   Calcium Carbonate-Vitamin D (CALCIUM-VITAMIN D) 600-125 MG-UNIT TABS Take by mouth   Yes Automatic Reconciliation, Ar   ferrous sulfate (FE TABS 325) 325 (65 Fe) MG EC tablet Take 1 tablet by mouth daily (with breakfast)   Yes Automatic Reconciliation, Ar   diazePAM (VALIUM) 2 MG tablet Take 2-4 mg by mouth every 8 hours prn anxiety associated with flying. Initial staring dose 2 mg, ok to increase to 4 mg if no significant symptom relief 23  Mahsa Overton MD   levonorgestrel (MIRENA) IUD 52 mg 1 each by IntraUTERine route once    Automatic Reconciliation, Ar       Current medications:    Current Facility-Administered Medications   Medication Dose Route Frequency Provider Last Rate Last Admin   • lidocaine 1 % injection 1 mL  1 mL IntraDERmal Once PRN Suleman Rivera IV, MD       • lactated ringers infusion   IntraVENous Continuous Suleman Rivera IV, MD       • sodium chloride flush 0.9 % injection 5-40 mL  5-40 mL IntraVENous 2 times per day Suleman Rivera IV, MD

## 2024-11-21 NOTE — OP NOTE
Operative Report    Patient: Maryellen Drew [facility] MRN: [id number]  SSN: [id number]    YOB: 1975  Age: 49 y.o.  Sex: female      Date of Surgery: 11/21/2024      Surgeon: Surgeons and Role:     * Chandler Joseph MD - Primary     Preoperative Diagnosis: Pre-Op Diagnosis Codes:      * Postmenopausal bleeding [N95.0]     * Menorrhagia [N92.0]    Postoperative Diagnosis: Post-Op Diagnosis Codes:     * Postmenopausal bleeding [N95.0]     * Menorrhagia [N92.0]    Anesthesia: General Anesthesiologist: Edwin Goldstein Jr., MD  CRNA: Enedelia Calle APRN - CRNA; Reji Seo APRN - CRNA     Procedure: Robotic Assisted Total Laparoscopic Hysterectomy with Bilateral Salpingo-Oophorectomy less than 250 grams, removal of IUD    Findings:  normal uterus and normal BSO    Estimated Blood Loss:  100    Drains: none    Specimens:    ID Type Source Tests Collected by Time Destination   A : Uterus, Cervix, Bilateral Tubes and Ovaries Tissue Uterus SURGICAL PATHOLOGY Chandler Joseph MD 11/21/2024 1130        DVT Prophylaxis: SCD's or Sequential Compression Device    Antibiotic Prophylaxis: ancef    Procedure Details:  After an adequate level of anesthesia was obtained, the patient was prepped and draped in the usual sterile fashion in the dorsal lithotomy position. Time out was done to confirm the operating procedure, surgeon, patient, pertinent data, and site.  Once confirmed by the team, the procedure was started. A weighted speculum was placed in the vagina and the cervix was grasped with a tenaculum. The uterus was sounded to a depth of 7 cm and cervix dilated. The Karoline-Carly manipulator was placed and balloon inflated. Instruments removed and gloves changed. An infraumbilical incision was made after first injecting with 0.5% marcaine, and the Veress needle inserted insufflating the peritoneal cavity with CO2. Opening pressure was noted to be 2mmHg.  Once the pressure reached 15mmHg, the Veress was

## 2024-11-21 NOTE — ANESTHESIA POSTPROCEDURE EVALUATION
Department of Anesthesiology  Postprocedure Note    Patient: Maryellen Drew [facility]  MRN: [id number]  YOB: 1975  Date of evaluation: 11/21/2024    Procedure Summary       Date: 11/21/24 Room / Location: [facility] MAIN OR [address] [place] [address] MAIN OR    Anesthesia Start: 0950 Anesthesia Stop: 1208    Procedure: ERAS ROBOTIC ASSISTED TOTAL LAPAROSCOPIC HYSTERECTOMY WITH BILATERAL SALPINGOOPHERECTOMY (Bilateral: Abdomen) Diagnosis:       Postmenopausal bleeding      Menorrhagia      (Postmenopausal bleeding [N95.0])      (Menorrhagia [N92.0])    Surgeons: Chandler Joseph MD Responsible Provider: Edwin Goldstein Jr., MD    Anesthesia Type: General ASA Status: 1            Anesthesia Type: General    Luis Angel Phase I: Luis Angel Score: 10    Luis Angel Phase II:      Anesthesia Post Evaluation    Patient location during evaluation: PACU  Patient participation: complete - patient participated  Level of consciousness: awake  Pain score: 0  Airway patency: patent  Nausea & Vomiting: no nausea and no vomiting  Cardiovascular status: blood pressure returned to baseline and hemodynamically stable  Respiratory status: acceptable, spontaneous ventilation and nonlabored ventilation  Hydration status: euvolemic  Multimodal analgesia pain management approach  Pain management: adequate    No notable events documented.

## 2024-11-21 NOTE — DISCHARGE INSTRUCTIONS
you can drive again. This is usually in 10-14 days.     You will probably need to take about 2 weeks off from work. It depends on the type of work you do and how you feel.     Ask your doctor when it is okay for you to have sex. This is typically AT LEAST 8 weeks.   Diet    You can eat your normal diet. If your stomach is upset, try bland, low-fat foods like plain rice, broiled chicken, toast, and yogurt.  Drink your protein shakes twice daily as instructed.     Drink plenty of fluids (unless your doctor tells you not to).     You may notice that your bowel movements are not regular right after your surgery. This is common. Try to avoid constipation and straining with bowel movements. You may want to take a fiber supplement or stool softener with fluids every day. If you have not had a bowel movement after a couple of days, ask your doctor about taking a mild laxative.   Medicines    Your doctor will tell you if and when you can restart your medicines. You will also get instructions about taking any new medicines.     If you take aspirin or some other blood thinner, ask your doctor if and when to start taking it again. Make sure that you understand exactly what your doctor wants you to do.     Be safe with medicines. Take pain medicines exactly as directed.  If the doctor gave you a prescription medicine for pain, take it as prescribed.  If you are not taking a prescription pain medicine, ask your doctor if you can take an over-the-counter medicine.     If you think your pain medicine is making you sick to your stomach:  Take your medicine after meals (unless your doctor has told you not to).  Ask your doctor for a different pain medicine.     If your doctor prescribed antibiotics, take them as directed. Do not stop taking them just because you feel better. You need to take the full course of antibiotics.         Incision care    You may have stitches over the cuts (incisions) the doctor made in your belly. If you  changes in your health, and be sure to contact your doctor ifyou have any problems.  Where can you learn more?  Go to [email or web address] and sign in to your [employer] account. Enter W297 in the Search Health Information box to learn more about \"Laparoscopically Assisted Vaginal Hysterectomy: What to Expect at Home.\"     If you do not have an account, please click on the \"Sign Up Now\" link.  Current as of: November 22, 2021               Content Version: 13.3  © 2520-8702 [employer].   Care instructions adapted under license by [employer]. If you have questions about a medical condition or this instruction, always ask your healthcare professional. [employer] disclaims any warranty or liability for your use of this information.

## 2024-11-21 NOTE — INTERVAL H&P NOTE
Update History & Physical    The patient's History and Physical  was reviewed with the patient and I examined the patient. There was no change. The surgical site was confirmed by the patient and me.     Plan: The risks, benefits, expected outcome, and alternative to the recommended procedure have been discussed with the patient. Patient understands and wants to proceed with the procedure.     Electronically signed by Chandler Joseph MD on 11/21/2024 at 9:31 AM      
I do not need any legal help

## 2024-12-09 ENCOUNTER — OFFICE VISIT (OUTPATIENT)
Dept: OBGYN CLINIC | Age: 49
End: 2024-12-09

## 2024-12-09 VITALS
DIASTOLIC BLOOD PRESSURE: 64 MMHG | HEIGHT: 65 IN | WEIGHT: 139 LBS | BODY MASS INDEX: 23.16 KG/M2 | SYSTOLIC BLOOD PRESSURE: 114 MMHG

## 2024-12-09 DIAGNOSIS — Z09 POSTOP CHECK: Primary | ICD-10-CM

## 2024-12-09 PROCEDURE — 99024 POSTOP FOLLOW-UP VISIT: CPT | Performed by: OBSTETRICS & GYNECOLOGY

## 2024-12-09 NOTE — PROGRESS NOTES
Maryellen presents for postop visit from Robotic Assisted Total Laparoscopic Hysterectomy with Bilateral Salpingo-Oophorectomy about 2 weeks ago.  Doing well postoperatively..  Pain is controlled, without any bleeding.  Fever: no Voiding well: yes.  Bowel movements OK: yes.     Exam: A&OX3, NAD.  Abdomen:  Non tender Incisions clean, dry, intact    A/P.  Stable Post op condition.  Surgery and Path reviewed. Gradually increase activity. Resumption of sexual activity is not recommended at this time.  Follow up 6 week(s).

## 2025-01-09 ENCOUNTER — OFFICE VISIT (OUTPATIENT)
Dept: OBGYN CLINIC | Age: 50
End: 2025-01-09

## 2025-01-09 VITALS
BODY MASS INDEX: 22.49 KG/M2 | SYSTOLIC BLOOD PRESSURE: 108 MMHG | DIASTOLIC BLOOD PRESSURE: 64 MMHG | WEIGHT: 135 LBS | HEIGHT: 65 IN

## 2025-01-09 DIAGNOSIS — Z09 POSTOP CHECK: Primary | ICD-10-CM

## 2025-01-09 PROCEDURE — 99024 POSTOP FOLLOW-UP VISIT: CPT | Performed by: OBSTETRICS & GYNECOLOGY

## 2025-01-09 SDOH — ECONOMIC STABILITY: FOOD INSECURITY: WITHIN THE PAST 12 MONTHS, THE FOOD YOU BOUGHT JUST DIDN'T LAST AND YOU DIDN'T HAVE MONEY TO GET MORE.: NEVER TRUE

## 2025-01-09 SDOH — ECONOMIC STABILITY: FOOD INSECURITY: WITHIN THE PAST 12 MONTHS, YOU WORRIED THAT YOUR FOOD WOULD RUN OUT BEFORE YOU GOT MONEY TO BUY MORE.: NEVER TRUE

## 2025-01-09 ASSESSMENT — PATIENT HEALTH QUESTIONNAIRE - PHQ9
SUM OF ALL RESPONSES TO PHQ QUESTIONS 1-9: 0
1. LITTLE INTEREST OR PLEASURE IN DOING THINGS: NOT AT ALL
SUM OF ALL RESPONSES TO PHQ QUESTIONS 1-9: 0
2. FEELING DOWN, DEPRESSED OR HOPELESS: NOT AT ALL
SUM OF ALL RESPONSES TO PHQ QUESTIONS 1-9: 0
SUM OF ALL RESPONSES TO PHQ9 QUESTIONS 1 & 2: 0
SUM OF ALL RESPONSES TO PHQ QUESTIONS 1-9: 0

## 2025-01-09 NOTE — PROGRESS NOTES
Maryellen presents for postop visit from Total Laparoscopic Hysterectomy about 8 weeks ago.  Doing well; no concerns. Reports she has not had intercourse. Denies any bleeding.  Fever: NO Voiding well: YES.  Bowel movements OK: YES.     Exam: A&OX3, NAD.  Abdomen:  Non tender Incisions clean, dry, intact.  Spec exam cuff intact, nontender to palpation, bimanual confirms. Clinical chaperone present.    A/P.  Stable Post op condition.  Gradually increase activity. Resumption of vaginal intercourse is allowed at this time.  Instructions and precautions given.  Follow up prn or for scheduled health screenings.    Maryellen was seen today for post-op check.    Diagnoses and all orders for this visit:    Postop check         No follow-ups on file.

## 2025-01-23 ENCOUNTER — OFFICE VISIT (OUTPATIENT)
Dept: OBGYN CLINIC | Age: 50
End: 2025-01-23
Payer: COMMERCIAL

## 2025-01-23 VITALS
DIASTOLIC BLOOD PRESSURE: 68 MMHG | SYSTOLIC BLOOD PRESSURE: 110 MMHG | WEIGHT: 135 LBS | BODY MASS INDEX: 22.49 KG/M2 | HEIGHT: 65 IN

## 2025-01-23 DIAGNOSIS — Z11.51 SCREENING FOR HPV (HUMAN PAPILLOMAVIRUS): ICD-10-CM

## 2025-01-23 DIAGNOSIS — Z01.419 WELL WOMAN EXAM WITH ROUTINE GYNECOLOGICAL EXAM: Primary | ICD-10-CM

## 2025-01-23 DIAGNOSIS — Z12.31 ENCOUNTER FOR SCREENING MAMMOGRAM FOR MALIGNANT NEOPLASM OF BREAST: ICD-10-CM

## 2025-01-23 DIAGNOSIS — N95.8 GENITOURINARY SYNDROME OF MENOPAUSE: ICD-10-CM

## 2025-01-23 DIAGNOSIS — Z12.72 SCREENING FOR VAGINAL CANCER: ICD-10-CM

## 2025-01-23 DIAGNOSIS — Z79.890 CURRENT LONG-TERM USE OF POSTMENOPAUSAL HORMONE REPLACEMENT THERAPY: ICD-10-CM

## 2025-01-23 PROCEDURE — 99396 PREV VISIT EST AGE 40-64: CPT | Performed by: OBSTETRICS & GYNECOLOGY

## 2025-01-23 RX ORDER — ESTRADIOL 0.1 MG/G
CREAM VAGINAL
Qty: 42.5 G | Refills: 3 | Status: SHIPPED | OUTPATIENT
Start: 2025-01-23

## 2025-01-23 RX ORDER — ESTRADIOL 0.1 MG/D
1 FILM, EXTENDED RELEASE TRANSDERMAL
Qty: 24 PATCH | Refills: 3 | Status: SHIPPED | OUTPATIENT
Start: 2025-01-23

## 2025-01-23 NOTE — PROGRESS NOTES
Annual Exam  Established pt.   Maryellen [facility]      48 y.o.  1975  [id number]    Today:  1/23/25    Presents for well woman annual exam.  Reports:   12/12/23:   Amenorrhea with her IUD, likes her estrogen patch, no hot flashes/night sweats.     H/o POF    BC:   IUD.    Past Medical History:   Diagnosis Date    Abnormal Pap smear of cervix     cryo    Contraception     Mirena (her 2nd IUD)    Ectopic pregnancy 2000    Exercise induced bronchospasm 6/28/2018    Family history of malignant neoplasm of colon     dad colon polyp & PGM colon cancer    History of colonoscopy 06/2020    Dr. Navarrete    Menopausal symptoms 2015    Hot Flashes    Multiple thyroid nodules 08/2019    US:  Bilateral small nodules, some mixed cystic/solid, none worrisome. Repeat 1 y/assess for interval change.    Premature ovarian failure     FSHs:  2017-->  23,   2018--->  88,    2019 ---> 17,     2020---> 8       Past Surgical History:   Procedure Laterality Date    BREAST SURGERY  2017    Replacement of right implant due to podular encapsulation.    COLONOSCOPY  2012    nl @ 33 yo, Dr. Navarrete.  Repeat due     COLPOSCOPY      DILATION AND CURETTAGE  2000    After ectopic?    DILATION AND CURETTAGE OF UTERUS  2003    HERNIA REPAIR  2008    [facility]    HYSTERECTOMY (CERVIX STATUS UNKNOWN) Bilateral 11/21/2024    ERAS ROBOTIC ASSISTED TOTAL LAPAROSCOPIC HYSTERECTOMY WITH BILATERAL SALPINGOOPHERECTOMY performed by Chandler Joseph MD at [facility] MAIN OR    IMPLANT BREAST SILICONE/EQ Bilateral 2014    revision for ruptured implant (R) 2017    UMBILICAL HERNIA REPAIR  2005       Family History   Problem Relation Age of Onset    Colon Cancer Paternal Grandmother     Other Maternal Grandmother         ?  Stomach and/or female cancer    Osteoarthritis Maternal Grandmother     Stomach Cancer Maternal Grandmother     Cancer Maternal Grandmother     Stroke Maternal Grandfather     Prostate Cancer Maternal Grandfather     Asthma

## 2025-04-15 RX ORDER — ESTRADIOL 0.1 MG/G
CREAM VAGINAL
Qty: 42.5 G | Refills: 2 | OUTPATIENT
Start: 2025-04-15

## 2025-04-15 RX ORDER — ESTRADIOL 0.1 MG/G
1 CREAM VAGINAL DAILY
Qty: 42.5 G | Refills: 2 | Status: SHIPPED | OUTPATIENT
Start: 2025-04-15

## 2025-04-21 ENCOUNTER — TRANSCRIBE ORDERS (OUTPATIENT)
Dept: SCHEDULING | Age: 50
End: 2025-04-21

## 2025-04-21 DIAGNOSIS — E78.00 ELEVATED LDL CHOLESTEROL LEVEL: Primary | ICD-10-CM

## 2025-09-02 ENCOUNTER — HOSPITAL ENCOUNTER (OUTPATIENT)
Dept: MAMMOGRAPHY | Age: 50
Discharge: HOME | End: 2025-09-05
Payer: COMMERCIAL

## 2025-09-02 DIAGNOSIS — Z12.31 ENCOUNTER FOR SCREENING MAMMOGRAM FOR MALIGNANT NEOPLASM OF BREAST: ICD-10-CM

## 2025-09-02 PROCEDURE — 77063 BREAST TOMOSYNTHESIS BI: CPT

## (undated) DEVICE — GLOVE ORANGE PI 7   MSG9070

## (undated) DEVICE — PAD PT POS 36 IN SURGYPAD DISP

## (undated) DEVICE — SUTURE VICRYL + SZ 0 L27IN ABSRB UD L36MM CT-1 1/2 CIR VCPP41D

## (undated) DEVICE — CANISTER, RIGID, 2000CC: Brand: MEDLINE INDUSTRIES, INC.

## (undated) DEVICE — SOLUTION IRRIG 1000ML STRL H2O USP PLAS POUR BTL

## (undated) DEVICE — GLOVE ORANGE PI 7 1/2   MSG9075

## (undated) DEVICE — ARM DRAPE

## (undated) DEVICE — GAUZE,SPONGE,2"X2",8PLY,STERILE,LF,2'S: Brand: MEDLINE

## (undated) DEVICE — SYRINGE MED 10ML LUERLOCK TIP W/O SFTY DISP

## (undated) DEVICE — BLADELESS OBTURATOR: Brand: WECK VISTA

## (undated) DEVICE — CONTROL SYRINGE LUER-LOCK TIP: Brand: MONOJECT

## (undated) DEVICE — ROBOTIC HYSTERECTOMY: Brand: MEDLINE INDUSTRIES, INC.

## (undated) DEVICE — SUTURE MONOCRYL + SZ 4-0 L27IN ABSRB UD L19MM PS-2 3/8 CIR MCP426H

## (undated) DEVICE — SEAL

## (undated) DEVICE — SUTURE V-LOC 180 SZ 0 L12IN ABSRB GRN L37MM GS-21 1/2 CIR VLOCL0316

## (undated) DEVICE — AIRSEAL 8 MM ACCESS PORT AND LOW PROFILE OBTURATOR WITH BLADELESS OPTICAL TIP, 120 MM LENGTH: Brand: AIRSEAL

## (undated) DEVICE — SUTURE MONOCRYL SZ 4-0 L27IN ABSRB UD L19MM PS-2 1/2 CIR PRIM Y426H

## (undated) DEVICE — AGENT HEMSTAT 3GM OXIDIZED REGENERATED CELOS ABSRB FOR CONT (ORDER MULTIPLES OF 5EA)

## (undated) DEVICE — OCCLUDER UTER DISP COLPO-PNEUMO

## (undated) DEVICE — TIP COVER ACCESSORY

## (undated) DEVICE — SYRINGE MED 50ML LUERLOCK TIP

## (undated) DEVICE — VESSEL SEALER EXTEND: Brand: ENDOWRIST

## (undated) DEVICE — COLUMN DRAPE

## (undated) DEVICE — APPLICATOR MEDICATED 26 CC SOLUTION HI LT ORNG CHLORAPREP

## (undated) DEVICE — 1LYRTR 16FR10ML 100%SILI SNAP: Brand: MEDLINE INDUSTRIES, INC.

## (undated) DEVICE — TIP IU L8CM DIA6.7MM BLU SIL FLX DISP RUMI II

## (undated) DEVICE — 3M™ TEGADERM™ TRANSPARENT FILM DRESSING FRAME STYLE, 1624W, 2-3/8 IN X 2-3/4 IN (6 CM X 7 CM), 100/CT 4CT/CASE: Brand: 3M™ TEGADERM™

## (undated) DEVICE — SURGICEL ENDOSCP APPL

## (undated) DEVICE — ROBOTIC DRAPE WITH LEGGINGS: Brand: CONVERTORS